# Patient Record
Sex: MALE | Race: BLACK OR AFRICAN AMERICAN | NOT HISPANIC OR LATINO | Employment: STUDENT | URBAN - METROPOLITAN AREA
[De-identification: names, ages, dates, MRNs, and addresses within clinical notes are randomized per-mention and may not be internally consistent; named-entity substitution may affect disease eponyms.]

---

## 2017-01-03 PROBLEM — D58.2 HEMOGLOBIN C TRAIT: Status: ACTIVE | Noted: 2017-01-03

## 2018-01-30 ENCOUNTER — LAB VISIT (OUTPATIENT)
Dept: LAB | Facility: HOSPITAL | Age: 23
End: 2018-01-30
Attending: INTERNAL MEDICINE
Payer: COMMERCIAL

## 2018-01-30 ENCOUNTER — OFFICE VISIT (OUTPATIENT)
Dept: INTERNAL MEDICINE | Facility: CLINIC | Age: 23
End: 2018-01-30
Payer: COMMERCIAL

## 2018-01-30 VITALS
BODY MASS INDEX: 21.55 KG/M2 | HEIGHT: 69 IN | HEART RATE: 64 BPM | DIASTOLIC BLOOD PRESSURE: 76 MMHG | SYSTOLIC BLOOD PRESSURE: 120 MMHG | RESPIRATION RATE: 18 BRPM | WEIGHT: 145.5 LBS | TEMPERATURE: 98 F

## 2018-01-30 DIAGNOSIS — R53.83 FATIGUE, UNSPECIFIED TYPE: ICD-10-CM

## 2018-01-30 DIAGNOSIS — M54.50 CHRONIC MIDLINE LOW BACK PAIN WITHOUT SCIATICA: Primary | ICD-10-CM

## 2018-01-30 DIAGNOSIS — M25.512 CHRONIC LEFT SHOULDER PAIN: ICD-10-CM

## 2018-01-30 DIAGNOSIS — G89.29 CHRONIC LEFT SHOULDER PAIN: ICD-10-CM

## 2018-01-30 DIAGNOSIS — G89.29 CHRONIC MIDLINE LOW BACK PAIN WITHOUT SCIATICA: Primary | ICD-10-CM

## 2018-01-30 DIAGNOSIS — R21 RASH AND NONSPECIFIC SKIN ERUPTION: ICD-10-CM

## 2018-01-30 LAB
25(OH)D3+25(OH)D2 SERPL-MCNC: 7 NG/ML
BASOPHILS # BLD AUTO: 0.02 K/UL
BASOPHILS NFR BLD: 0.4 %
DIFFERENTIAL METHOD: ABNORMAL
EOSINOPHIL # BLD AUTO: 0.2 K/UL
EOSINOPHIL NFR BLD: 4 %
ERYTHROCYTE [DISTWIDTH] IN BLOOD BY AUTOMATED COUNT: 12.4 %
HCT VFR BLD AUTO: 39.1 %
HGB BLD-MCNC: 13.2 G/DL
IMM GRANULOCYTES # BLD AUTO: 0.01 K/UL
IMM GRANULOCYTES NFR BLD AUTO: 0.2 %
LYMPHOCYTES # BLD AUTO: 1.7 K/UL
LYMPHOCYTES NFR BLD: 36.6 %
MCH RBC QN AUTO: 28.6 PG
MCHC RBC AUTO-ENTMCNC: 33.8 G/DL
MCV RBC AUTO: 85 FL
MONOCYTES # BLD AUTO: 0.5 K/UL
MONOCYTES NFR BLD: 9.7 %
NEUTROPHILS # BLD AUTO: 2.3 K/UL
NEUTROPHILS NFR BLD: 49.1 %
NRBC BLD-RTO: 0 /100 WBC
PLATELET # BLD AUTO: 194 K/UL
PMV BLD AUTO: 14.2 FL
RBC # BLD AUTO: 4.61 M/UL
TSH SERPL DL<=0.005 MIU/L-ACNC: 0.49 UIU/ML
WBC # BLD AUTO: 4.76 K/UL

## 2018-01-30 PROCEDURE — 99214 OFFICE O/P EST MOD 30 MIN: CPT | Mod: S$PBB,,, | Performed by: INTERNAL MEDICINE

## 2018-01-30 PROCEDURE — 99999 PR PBB SHADOW E&M-EST. PATIENT-LVL IV: CPT | Mod: PBBFAC,,, | Performed by: INTERNAL MEDICINE

## 2018-01-30 PROCEDURE — 3008F BODY MASS INDEX DOCD: CPT | Mod: ,,, | Performed by: INTERNAL MEDICINE

## 2018-01-30 PROCEDURE — 84443 ASSAY THYROID STIM HORMONE: CPT

## 2018-01-30 PROCEDURE — 85025 COMPLETE CBC W/AUTO DIFF WBC: CPT

## 2018-01-30 PROCEDURE — 36415 COLL VENOUS BLD VENIPUNCTURE: CPT | Mod: PO

## 2018-01-30 PROCEDURE — 82306 VITAMIN D 25 HYDROXY: CPT

## 2018-01-30 NOTE — PATIENT INSTRUCTIONS
Cortizone cream to back, avoid irritants    Try Therma Care heat paches on low back    Try salon pas with lidocaine

## 2018-01-30 NOTE — PROGRESS NOTES
Subjective:       Patient ID: Maria Ines Lea is a 22 y.o. male who presents for Back Pain; Shoulder Pain; and Fatigue      Back Pain   This is a recurrent problem. The current episode started more than 1 year ago. The problem occurs intermittently. The problem has been waxing and waning since onset. The pain is present in the lumbar spine. The quality of the pain is described as aching. The pain does not radiate. The pain is at a severity of 6/10. The pain is moderate. The pain is worse during the day. The symptoms are aggravated by bending, position, standing and stress (notices pain when he is active at work with bending and lifting). Stiffness is present in the morning. Associated symptoms include leg pain. Pertinent negatives include no abdominal pain, bladder incontinence, bowel incontinence, chest pain, dysuria, fever, headaches, numbness, paresis, paresthesias, pelvic pain, perianal numbness, tingling, weakness or weight loss. He has tried NSAIDs, brace/corset and heat for the symptoms.   Shoulder Pain    The pain is present in the left shoulder. This is a chronic problem. The current episode started more than 1 month ago. The problem occurs intermittently. The problem has been waxing and waning. The quality of the pain is described as aching. The pain is moderate. Associated symptoms include stiffness. Pertinent negatives include no fever, headaches, joint locking, limited range of motion, numbness or tingling. The symptoms are aggravated by activity. He has tried nothing for the symptoms. Family history does not include arthritis. There is no history of Injuries to Extremity.   Fatigue   This is a new problem. The current episode started more than 1 month ago. The problem occurs constantly. The problem has been unchanged (patient works 12 hours almost every night and goes to school). Associated symptoms include fatigue and a rash (on upper neck, denies itching). Pertinent negatives include no abdominal  pain, arthralgias, chest pain, chills, congestion, coughing, fever, headaches, myalgias, nausea, numbness, vertigo, vomiting or weakness. Nothing aggravates the symptoms. He has tried nothing for the symptoms.   Rash   This is a new problem. The current episode started in the past 7 days. The problem is unchanged. The affected locations include the neck and back. He was exposed to a new detergent/soap (used new soap and cologne in last few weeks). Associated symptoms include fatigue. Pertinent negatives include no congestion, cough, diarrhea, fever, rhinorrhea, shortness of breath or vomiting. Past treatments include nothing. His past medical history is significant for asthma. There is no history of allergies.        Answers for HPI/ROS submitted by the patient on 1/30/2018   Back pain  Chronicity: recurrent  Onset: more than 1 year ago  Frequency: intermittently  Progression since onset: waxing and waning  Pain location: lumbar spine  Pain quality: aching  Radiates to: does not radiate  Pain - numeric: 6/10  Pain is: worse during the day  Aggravated by: bending, position, standing, stress  Stiffness is present: in the morning  abdominal pain: No  bladder incontinence: No  bowel incontinence: No  chest pain: No  dysuria: No  fever: No  headaches: No  leg pain: Yes  numbness: No  paresis: No  paresthesias: No  pelvic pain: No  perianal numbness: No  tingling: No  weakness: No  weight loss: No  genital pain: No  hematuria: No  Pain severity: moderate  Treatments tried: NSAIDs, brace/corset, heat      Review of Systems   Constitutional: Positive for fatigue. Negative for chills, fever and weight loss.   HENT: Negative for congestion, rhinorrhea, sinus pressure and tinnitus (reports episode of ringing in ear after heard fireworks very close to him, ringing later resolved and has not returned ).    Eyes: Negative for visual disturbance.   Respiratory: Negative for cough and shortness of breath.    Cardiovascular:  Negative for chest pain, palpitations and leg swelling.   Gastrointestinal: Negative for abdominal pain, bowel incontinence, constipation, diarrhea, nausea and vomiting.   Genitourinary: Negative for bladder incontinence, dysuria, hematuria and pelvic pain.   Musculoskeletal: Positive for back pain and stiffness. Negative for arthralgias and myalgias.   Skin: Positive for rash (on upper neck, denies itching).   Neurological: Negative for dizziness, vertigo, tingling, weakness, numbness, headaches and paresthesias.   Hematological: Negative for adenopathy. Does not bruise/bleed easily.       Answers for HPI/ROS submitted by the patient on 1/30/2018   Back pain  genital pain: No      Objective:      Physical Exam   Constitutional: He is oriented to person, place, and time. Vital signs are normal. He appears well-developed and well-nourished. No distress.   HENT:   Head: Normocephalic and atraumatic.   Right Ear: Hearing, tympanic membrane, external ear and ear canal normal.   Left Ear: Hearing, tympanic membrane, external ear and ear canal normal.   Nose: Nose normal.   Mouth/Throat: Uvula is midline.   Eyes: Lids are normal. No scleral icterus.   Neck: Full passive range of motion without pain.   Cardiovascular: Normal rate, regular rhythm, normal heart sounds, intact distal pulses and normal pulses.    No murmur heard.  Pulmonary/Chest: Effort normal and breath sounds normal. He has no wheezes.   Abdominal: Soft. Bowel sounds are normal. He exhibits no distension. There is no tenderness.   Musculoskeletal: Normal range of motion. He exhibits no edema.        Left shoulder: He exhibits normal range of motion, no bony tenderness and no pain.        Cervical back: He exhibits no bony tenderness, no pain and no spasm.        Thoracic back: He exhibits no bony tenderness, no pain and no spasm.        Lumbar back: He exhibits no bony tenderness, no pain and no spasm.   Negative straight leg raise   Neurological: He is  alert and oriented to person, place, and time. He has normal reflexes. He displays normal reflexes. Coordination and gait normal.   Skin: Skin is warm, dry and intact. Rash noted. Rash is maculopapular (posterior neck, no erythema, no scaling).   Psychiatric: He has a normal mood and affect.   Vitals reviewed.      Assessment:       1. Chronic midline low back pain without sciatica    2. Chronic left shoulder pain    3. Fatigue, unspecified type    4. Rash and nonspecific skin eruption        Plan:       1. Chronic midline low back pain without sciatica  - patient is hesitant to begin medication since he cannot swallow pills well  - declines PT or healthy back program due to time limitations  - may use topical analgesics or heat patches when back pain is severe    2. Chronic left shoulder pain  - trial of topical analgesics or may use liquid ibuprofen or tylenol as needed for pains    3. Fatigue, unspecified type  - CBC auto differential; Future  - Vitamin D; Future  - TSH; Future    4. Rash and nonspecific skin eruption  - stop using new bath products and cologne for 1-2 weeks  - use OTC hydrocortisone cream on area twice daily  - determine if other products may have caused rash, call if worsens    RTC in 4 months for annual exam    Mirtha Arguelles MD

## 2018-01-31 PROBLEM — E55.9 VITAMIN D DEFICIENCY: Status: ACTIVE | Noted: 2018-01-31

## 2018-01-31 RX ORDER — ERGOCALCIFEROL 1.25 MG/1
50000 CAPSULE ORAL
Qty: 4 CAPSULE | Refills: 2
Start: 2018-01-31 | End: 2018-01-31 | Stop reason: ALTCHOICE

## 2018-03-19 ENCOUNTER — OFFICE VISIT (OUTPATIENT)
Dept: URGENT CARE | Facility: CLINIC | Age: 23
End: 2018-03-19
Payer: COMMERCIAL

## 2018-03-19 VITALS
HEART RATE: 71 BPM | WEIGHT: 145 LBS | RESPIRATION RATE: 18 BRPM | BODY MASS INDEX: 21.48 KG/M2 | DIASTOLIC BLOOD PRESSURE: 80 MMHG | OXYGEN SATURATION: 97 % | SYSTOLIC BLOOD PRESSURE: 116 MMHG | TEMPERATURE: 98 F | HEIGHT: 69 IN

## 2018-03-19 DIAGNOSIS — R68.89 FLU-LIKE SYMPTOMS: ICD-10-CM

## 2018-03-19 DIAGNOSIS — J30.9 ACUTE ALLERGIC RHINITIS, UNSPECIFIED SEASONALITY, UNSPECIFIED TRIGGER: Primary | ICD-10-CM

## 2018-03-19 DIAGNOSIS — J02.9 SORE THROAT: ICD-10-CM

## 2018-03-19 DIAGNOSIS — Z87.09 HISTORY OF ASTHMA: ICD-10-CM

## 2018-03-19 LAB
CTP QC/QA: YES
CTP QC/QA: YES
FLUAV AG NPH QL: NEGATIVE
FLUBV AG NPH QL: NEGATIVE
S PYO RRNA THROAT QL PROBE: NEGATIVE

## 2018-03-19 PROCEDURE — 87880 STREP A ASSAY W/OPTIC: CPT | Mod: QW,S$GLB,, | Performed by: NURSE PRACTITIONER

## 2018-03-19 PROCEDURE — 87804 INFLUENZA ASSAY W/OPTIC: CPT | Mod: QW,S$GLB,, | Performed by: NURSE PRACTITIONER

## 2018-03-19 PROCEDURE — 99214 OFFICE O/P EST MOD 30 MIN: CPT | Mod: S$GLB,,, | Performed by: NURSE PRACTITIONER

## 2018-03-19 RX ORDER — MONTELUKAST SODIUM 10 MG/1
10 TABLET ORAL DAILY
Qty: 30 TABLET | Refills: 0 | Status: SHIPPED | OUTPATIENT
Start: 2018-03-19 | End: 2018-04-16 | Stop reason: SDUPTHER

## 2018-03-19 RX ORDER — FLUTICASONE PROPIONATE 50 MCG
1 SPRAY, SUSPENSION (ML) NASAL 2 TIMES DAILY
Qty: 1 BOTTLE | Refills: 3 | Status: SHIPPED | OUTPATIENT
Start: 2018-03-19

## 2018-03-19 NOTE — PROGRESS NOTES
"Subjective:       Patient ID: Maria Ines Lea is a 22 y.o. male.    Vitals:  height is 5' 9" (1.753 m) and weight is 65.8 kg (145 lb). His oral temperature is 98 °F (36.7 °C). His blood pressure is 116/80 and his pulse is 71. His respiration is 18 and oxygen saturation is 97%.     Chief Complaint: Sore Throat    1-2 days of itchy watery eyes, scratchy throat, malaise and sinus congestion with headache.  No treatments tried. No fever, N/V/D or sick contacts. Endorses history of allergy.      Sore Throat    This is a new problem. The current episode started today. The problem has been gradually worsening. Neither side of throat is experiencing more pain than the other. There has been no fever. The pain is at a severity of 6/10. The pain is moderate. Associated symptoms include congestion, headaches and trouble swallowing. Pertinent negatives include no abdominal pain, coughing, ear pain, hoarse voice or shortness of breath. He has tried nothing for the symptoms.     Review of Systems   Constitution: Negative for chills, fever and malaise/fatigue.   HENT: Positive for congestion, sore throat and trouble swallowing. Negative for ear pain and hoarse voice.    Eyes: Positive for discharge. Negative for redness.   Cardiovascular: Negative for chest pain, dyspnea on exertion and leg swelling.   Respiratory: Negative for cough, shortness of breath, sputum production and wheezing.    Musculoskeletal: Negative for myalgias.   Gastrointestinal: Negative for abdominal pain and nausea.   Neurological: Positive for headaches.       Objective:      Physical Exam   Constitutional: He is oriented to person, place, and time. He appears well-developed and well-nourished. He is cooperative.  Non-toxic appearance. He does not appear ill. No distress.   HENT:   Head: Normocephalic and atraumatic.   Right Ear: Hearing, tympanic membrane, external ear and ear canal normal.   Left Ear: Hearing, tympanic membrane, external ear and ear " canal normal.   Nose: Rhinorrhea present. No mucosal edema or nasal deformity. No epistaxis. Right sinus exhibits no maxillary sinus tenderness and no frontal sinus tenderness. Left sinus exhibits no maxillary sinus tenderness and no frontal sinus tenderness.   Mouth/Throat: Uvula is midline and mucous membranes are normal. No trismus in the jaw. Normal dentition. No uvula swelling. Posterior oropharyngeal erythema present.   Eyes: Conjunctivae and lids are normal. Right eye exhibits exudate. Left eye exhibits exudate. No scleral icterus.   Watery exudate   Neck: Trachea normal, full passive range of motion without pain and phonation normal. Neck supple.   Cardiovascular: Normal rate, regular rhythm, normal heart sounds, intact distal pulses and normal pulses.    Pulmonary/Chest: Effort normal and breath sounds normal. No respiratory distress. He has no wheezes. He has no rhonchi. He has no rales.   Abdominal: Soft. Normal appearance and bowel sounds are normal. He exhibits no distension. There is no tenderness.   Musculoskeletal: Normal range of motion. He exhibits no edema or deformity.   Neurological: He is alert and oriented to person, place, and time. He exhibits normal muscle tone. Coordination normal.   Skin: Skin is warm, dry and intact. He is not diaphoretic. No pallor.   Psychiatric: He has a normal mood and affect. His speech is normal and behavior is normal. Judgment and thought content normal. Cognition and memory are normal.   Nursing note and vitals reviewed.      POCT Influenza A/B   Order: [id number]   Status:  Final result   Visible to patient:  No (Not Released)   Next appt:  None   Dx:  Flu-like symptoms    Ref Range & Units 11:03   Rapid Influenza A Ag Negative Negative    Rapid Influenza B Ag Negative Negative     Acceptable  Yes    Resulting Agency  [facility]           POCT rapid strep A   Order: [id number]   Status:  Final result   Visible to patient:  No (Not Released)   Next appt:   None   Dx:  Sore throat   Newer results are available. Click to view them now.    Ref Range & Units 10:59   Rapid Strep A Screen Negative Negative     Acceptable  Yes    Resulting Agency  [facility]               Assessment:       1. Acute allergic rhinitis, unspecified seasonality, unspecified trigger    2. Sore throat    3. Flu-like symptoms    4. History of asthma        Plan:         Acute allergic rhinitis, unspecified seasonality, unspecified trigger  -     montelukast (SINGULAIR) 10 mg tablet; Take 1 tablet (10 mg total) by mouth once daily.  Dispense: 30 tablet; Refill: 0  -     fluticasone (FLONASE) 50 mcg/actuation nasal spray; 1 spray (50 mcg total) by Each Nare route 2 (two) times daily.  Dispense: 1 Bottle; Refill: 3    Sore throat  -     POCT rapid strep A    Flu-like symptoms  -     POCT Influenza A/B    History of asthma  -     montelukast (SINGULAIR) 10 mg tablet; Take 1 tablet (10 mg total) by mouth once daily.  Dispense: 30 tablet; Refill: 0      Patient Instructions     Please drink plenty of fluids.  Please get plenty of rest.  Please return here or go to the Emergency Department for any concerns or worsening of condition.  You can take over the counter Allegra or Claritin or Zyrtec as directed during the daytime hours as these generally do not cause drowsiness.  Please follow up with your primary care doctor or specialist as needed.    If you  smoke, please stop smoking.    Allergic Rhinitis   Allergic rhinitis is an allergic reaction that affects the nose, and often the eyes. Its often known as nasal allergies. Nasal allergies are often due to things in the environment that are breathed in. Depending what you are sensitive to, nasal allergies may occur only during certain seasons. Or they may occur year round. Common indoor allergens include house dust mites, mold, cockroaches, and pet dander. Outdoor allergens include pollen from trees, grasses, and weeds.   Symptoms include a  drippy, stuffy, and itchy nose. They also include sneezing and red and itchy eyes. You may feel tired more often. Severe allergies may also affect your breathing and trigger a condition called asthma.   Tests can be done to see what allergens are affecting you. You may be referred to an allergy specialist for testing and further evaluation.  Home care  Your healthcare provider may prescribe medicines to help relieve allergy symptoms. These may include oral medicines, nasal sprays, or eye drops.  Ask your provider for advice on how to avoid substances that you are allergic to. Below are a few tips for each type of allergen.  Pet dander:  · Do not have pets with fur and feathers.  · If you can't avoid having a pet, keep it out of your bedroom and off upholstered furniture.  Pollen:  · When pollen counts are high, keep windows of your car and home closed. If possible, use an air conditioner instead.  · Wear a filter mask when mowing or doing yard work.  House dust mites:  · Wash bedding every week in warm water and detergent and dry on a hot setting.  · Cover the mattress, box spring, and pillows with allergy covers.   · If possible, sleep in a room with no carpet, curtains, or upholstered furniture.  Cockroaches:  · Store food in sealed containers.  · Remove garbage from the home promptly.  · Fix water leaks  Mold:  · Keep humidity low by using a dehumidifier or air conditioner. Keep the dehumidifier and air conditioner clean and free of mold.  · Clean moldy areas with bleach and water.  In general:  · Vacuum once or twice a week. If possible, use a vacuum with a high-efficiency particulate air (HEPA) filter.  · Do not smoke. Avoid cigarette smoke. Cigarette smoke is an irritant that can make symptoms worse.  Follow-up care  Follow up as advised by the healthcare provider or our staff. If you were referred to an allergy specialist, make this appointment promptly.  When to seek medical advice  Call your healthcare  provider right away if the following occur:  · Coughing or wheezing  · Fever greater than 100.4°F (38°C)  · Hives (raised red bumps)  · Continuing symptoms, new symptoms, or worsening symptoms  Call 911 right away if you have:  · Trouble breathing  · Severe swelling of the face or severe itching of the eyes or mouth  Date Last Reviewed: 3/1/2017  © 5351-6196 [employer]. [address] [place] [address], [place] [address]. All rights reserved. This information is not intended as a substitute for professional medical care. Always follow your healthcare professional's instructions.

## 2018-03-19 NOTE — PATIENT INSTRUCTIONS
Please drink plenty of fluids.  Please get plenty of rest.  Please return here or go to the Emergency Department for any concerns or worsening of condition.  You can take over the counter Allegra or Claritin or Zyrtec as directed during the daytime hours as these generally do not cause drowsiness.  Please follow up with your primary care doctor or specialist as needed.    If you  smoke, please stop smoking.    Allergic Rhinitis   Allergic rhinitis is an allergic reaction that affects the nose, and often the eyes. Its often known as nasal allergies. Nasal allergies are often due to things in the environment that are breathed in. Depending what you are sensitive to, nasal allergies may occur only during certain seasons. Or they may occur year round. Common indoor allergens include house dust mites, mold, cockroaches, and pet dander. Outdoor allergens include pollen from trees, grasses, and weeds.   Symptoms include a drippy, stuffy, and itchy nose. They also include sneezing and red and itchy eyes. You may feel tired more often. Severe allergies may also affect your breathing and trigger a condition called asthma.   Tests can be done to see what allergens are affecting you. You may be referred to an allergy specialist for testing and further evaluation.  Home care  Your healthcare provider may prescribe medicines to help relieve allergy symptoms. These may include oral medicines, nasal sprays, or eye drops.  Ask your provider for advice on how to avoid substances that you are allergic to. Below are a few tips for each type of allergen.  Pet dander:  · Do not have pets with fur and feathers.  · If you can't avoid having a pet, keep it out of your bedroom and off upholstered furniture.  Pollen:  · When pollen counts are high, keep windows of your car and home closed. If possible, use an air conditioner instead.  · Wear a filter mask when mowing or doing yard work.  House dust mites:  · Wash bedding every week in warm  water and detergent and dry on a hot setting.  · Cover the mattress, box spring, and pillows with allergy covers.   · If possible, sleep in a room with no carpet, curtains, or upholstered furniture.  Cockroaches:  · Store food in sealed containers.  · Remove garbage from the home promptly.  · Fix water leaks  Mold:  · Keep humidity low by using a dehumidifier or air conditioner. Keep the dehumidifier and air conditioner clean and free of mold.  · Clean moldy areas with bleach and water.  In general:  · Vacuum once or twice a week. If possible, use a vacuum with a high-efficiency particulate air (HEPA) filter.  · Do not smoke. Avoid cigarette smoke. Cigarette smoke is an irritant that can make symptoms worse.  Follow-up care  Follow up as advised by the healthcare provider or our staff. If you were referred to an allergy specialist, make this appointment promptly.  When to seek medical advice  Call your healthcare provider right away if the following occur:  · Coughing or wheezing  · Fever greater than 100.4°F (38°C)  · Hives (raised red bumps)  · Continuing symptoms, new symptoms, or worsening symptoms  Call 911 right away if you have:  · Trouble breathing  · Severe swelling of the face or severe itching of the eyes or mouth  Date Last Reviewed: 3/1/2017  © 8442-9075 [employer]. [address] [place] [address], [place] [address]. All rights reserved. This information is not intended as a substitute for professional medical care. Always follow your healthcare professional's instructions.

## 2018-04-16 DIAGNOSIS — Z87.09 HISTORY OF ASTHMA: ICD-10-CM

## 2018-04-16 DIAGNOSIS — J30.9 ACUTE ALLERGIC RHINITIS, UNSPECIFIED SEASONALITY, UNSPECIFIED TRIGGER: ICD-10-CM

## 2018-04-16 RX ORDER — MONTELUKAST SODIUM 10 MG/1
10 TABLET ORAL DAILY
Qty: 30 TABLET | Refills: 0 | Status: SHIPPED | OUTPATIENT
Start: 2018-04-16 | End: 2019-02-18

## 2018-08-27 ENCOUNTER — LAB VISIT (OUTPATIENT)
Dept: LAB | Facility: HOSPITAL | Age: 23
End: 2018-08-27
Attending: INTERNAL MEDICINE
Payer: COMMERCIAL

## 2018-08-27 ENCOUNTER — OFFICE VISIT (OUTPATIENT)
Dept: INTERNAL MEDICINE | Facility: CLINIC | Age: 23
End: 2018-08-27
Payer: COMMERCIAL

## 2018-08-27 VITALS
WEIGHT: 159.19 LBS | HEIGHT: 66 IN | RESPIRATION RATE: 16 BRPM | SYSTOLIC BLOOD PRESSURE: 125 MMHG | BODY MASS INDEX: 25.58 KG/M2 | DIASTOLIC BLOOD PRESSURE: 78 MMHG | TEMPERATURE: 99 F | HEART RATE: 83 BPM

## 2018-08-27 DIAGNOSIS — E55.9 VITAMIN D DEFICIENCY: ICD-10-CM

## 2018-08-27 DIAGNOSIS — Z20.2 POSSIBLE EXPOSURE TO STD: ICD-10-CM

## 2018-08-27 DIAGNOSIS — R21 RASH AND NONSPECIFIC SKIN ERUPTION: Primary | ICD-10-CM

## 2018-08-27 PROBLEM — Z87.2 HISTORY OF ECZEMA: Status: ACTIVE | Noted: 2018-08-27

## 2018-08-27 LAB — 25(OH)D3+25(OH)D2 SERPL-MCNC: 21 NG/ML

## 2018-08-27 PROCEDURE — 36415 COLL VENOUS BLD VENIPUNCTURE: CPT | Mod: PO

## 2018-08-27 PROCEDURE — 99213 OFFICE O/P EST LOW 20 MIN: CPT | Mod: S$GLB,,, | Performed by: INTERNAL MEDICINE

## 2018-08-27 PROCEDURE — 82306 VITAMIN D 25 HYDROXY: CPT

## 2018-08-27 PROCEDURE — 80074 ACUTE HEPATITIS PANEL: CPT

## 2018-08-27 PROCEDURE — 86696 HERPES SIMPLEX TYPE 2 TEST: CPT

## 2018-08-27 PROCEDURE — 3008F BODY MASS INDEX DOCD: CPT | Mod: CPTII,S$GLB,, | Performed by: INTERNAL MEDICINE

## 2018-08-27 PROCEDURE — 86592 SYPHILIS TEST NON-TREP QUAL: CPT

## 2018-08-27 PROCEDURE — 86703 HIV-1/HIV-2 1 RESULT ANTBDY: CPT

## 2018-08-27 PROCEDURE — 99999 PR PBB SHADOW E&M-EST. PATIENT-LVL III: CPT | Mod: PBBFAC,,, | Performed by: INTERNAL MEDICINE

## 2018-08-27 RX ORDER — DESONIDE 0.5 MG/ML
LOTION TOPICAL 2 TIMES DAILY
Qty: 59 ML | Refills: 0 | Status: SHIPPED | OUTPATIENT
Start: 2018-08-27 | End: 2019-04-01

## 2018-08-27 NOTE — PROGRESS NOTES
Subjective:       Patient ID: Maria Ines Lea is a 23 y.o. male who presents for Rash and Back Pain      Rash   This is a recurrent problem. The current episode started 1 to 4 weeks ago. The problem has been waxing and waning since onset. The affected locations include the right arm and left arm. The rash is characterized by scaling (raised lesion on upper arms). He was exposed to nothing. Pertinent negatives include no congestion, cough, diarrhea, fever, rhinorrhea, shortness of breath or vomiting. Past treatments include moisturizer (uses neosporin eczema). The treatment provided no relief. His past medical history is significant for eczema.   Low-back Pain   This is a recurrent problem. The current episode started 1 to 4 weeks ago. The problem occurs intermittently. The problem has been waxing and waning. Associated symptoms include a rash. Pertinent negatives include no abdominal pain, arthralgias, chest pain, chills, congestion, coughing, fever, headaches, myalgias, nausea, numbness or vomiting. Nothing aggravates the symptoms. Treatments tried: tried Icy Hot. The treatment provided mild relief.      Scaly lesions on feet intermittently, + itch.      Review of Systems   Constitutional: Negative for chills and fever.   HENT: Negative for congestion, rhinorrhea and sinus pressure.    Eyes: Negative for redness and itching.   Respiratory: Negative for cough and shortness of breath.    Cardiovascular: Negative for chest pain and palpitations.   Gastrointestinal: Negative for abdominal pain, diarrhea, nausea and vomiting.   Musculoskeletal: Positive for back pain. Negative for arthralgias and myalgias.   Skin: Positive for rash.   Allergic/Immunologic: Negative for environmental allergies.   Neurological: Negative for dizziness, numbness and headaches.       Objective:      Physical Exam   Constitutional: He is oriented to person, place, and time. Vital signs are normal. He appears well-developed and  well-nourished. No distress.   HENT:   Head: Normocephalic and atraumatic.   Right Ear: Hearing and external ear normal.   Left Ear: Hearing and external ear normal.   Nose: Nose normal.   Eyes: Lids are normal. No scleral icterus.   Neck: Full passive range of motion without pain.   Cardiovascular: Normal rate, regular rhythm, normal heart sounds, intact distal pulses and normal pulses.   No murmur heard.  Pulmonary/Chest: Effort normal and breath sounds normal. He has no wheezes.   Abdominal: Soft. Bowel sounds are normal. He exhibits no distension. There is no tenderness.   Musculoskeletal: Normal range of motion. He exhibits no edema.        Cervical back: He exhibits no bony tenderness and no pain.        Thoracic back: He exhibits no bony tenderness and no pain.        Lumbar back: He exhibits no bony tenderness and no pain.   Neurological: He is alert and oriented to person, place, and time.   Skin: Skin is warm, dry and intact. Rash noted. Rash is macular.   Psychiatric: He has a normal mood and affect.   Vitals reviewed.      Assessment/Plan:       1. Rash and nonspecific skin eruption  - desonide (DESOWEN) 0.05 % lotion; Apply topically 2 (two) times daily. for 10 days  Dispense: 59 mL; Refill: 0    2. Vitamin D deficiency  - Vitamin D; Future    3. Possible exposure to STD  - C. trachomatis/N. gonorrhoeae by AMP DNA; Future  - Hepatitis panel, acute; Future  - Herpes simplex type 1&2 IgG,Herpes titer; Future  - HIV-1 and HIV-2 antibodies; Future  - RPR; Future    Mirtha Arguelles MD

## 2018-08-28 LAB
HAV IGM SERPL QL IA: NEGATIVE
HBV CORE IGM SERPL QL IA: NEGATIVE
HBV SURFACE AG SERPL QL IA: NEGATIVE
HCV AB SERPL QL IA: NEGATIVE
HIV 1+2 AB+HIV1 P24 AG SERPL QL IA: NEGATIVE
HSV1 IGG SERPL QL IA: NEGATIVE
HSV2 IGG SERPL QL IA: NEGATIVE
RPR SER QL: NORMAL

## 2019-02-18 ENCOUNTER — OFFICE VISIT (OUTPATIENT)
Dept: INTERNAL MEDICINE | Facility: CLINIC | Age: 24
End: 2019-02-18
Payer: COMMERCIAL

## 2019-02-18 ENCOUNTER — TELEPHONE (OUTPATIENT)
Dept: INTERNAL MEDICINE | Facility: CLINIC | Age: 24
End: 2019-02-18

## 2019-02-18 VITALS
TEMPERATURE: 98 F | BODY MASS INDEX: 26.53 KG/M2 | HEART RATE: 74 BPM | HEIGHT: 67 IN | WEIGHT: 169.06 LBS | RESPIRATION RATE: 16 BRPM | DIASTOLIC BLOOD PRESSURE: 87 MMHG | SYSTOLIC BLOOD PRESSURE: 120 MMHG

## 2019-02-18 DIAGNOSIS — M25.562 ACUTE PAIN OF LEFT KNEE: Primary | ICD-10-CM

## 2019-02-18 DIAGNOSIS — Z00.00 ANNUAL PHYSICAL EXAM: Primary | ICD-10-CM

## 2019-02-18 PROCEDURE — 99999 PR PBB SHADOW E&M-EST. PATIENT-LVL III: ICD-10-PCS | Mod: PBBFAC,,, | Performed by: HOSPITALIST

## 2019-02-18 PROCEDURE — 99213 OFFICE O/P EST LOW 20 MIN: CPT | Mod: S$GLB,,, | Performed by: HOSPITALIST

## 2019-02-18 PROCEDURE — 3008F PR BODY MASS INDEX (BMI) DOCUMENTED: ICD-10-PCS | Mod: CPTII,S$GLB,, | Performed by: HOSPITALIST

## 2019-02-18 PROCEDURE — 99999 PR PBB SHADOW E&M-EST. PATIENT-LVL III: CPT | Mod: PBBFAC,,, | Performed by: HOSPITALIST

## 2019-02-18 PROCEDURE — 99213 PR OFFICE/OUTPT VISIT, EST, LEVL III, 20-29 MIN: ICD-10-PCS | Mod: S$GLB,,, | Performed by: HOSPITALIST

## 2019-02-18 PROCEDURE — 3008F BODY MASS INDEX DOCD: CPT | Mod: CPTII,S$GLB,, | Performed by: HOSPITALIST

## 2019-02-18 NOTE — TELEPHONE ENCOUNTER
Please enter orders for routine labs to be drawn and alert staff so they can be linked to appt. Thank you

## 2019-02-18 NOTE — TELEPHONE ENCOUNTER
----- Message from Mayela Chairez sent at 2/18/2019  1:57 PM CST -----  Contact:  [phone]  Doctor appointment and lab have been scheduled.  Please link lab orders to the lab appointment.  Date of doctor appointment:  04-  Physical or EP:  PHYSICAL  Date of lab appointment:  03-  Comments:     REMINDER to appt coordinator: ## delete this from the message after reading! ##     1) The lab appointment must be at least 3 days from now to allow time for the order to be entered and linked to the appointment.   2) Lab appointment should be scheduled at least 3 days before the doctor appointment.

## 2019-02-18 NOTE — PROGRESS NOTES
"Subjective:     @Patient ID: Maria Ines Lea is a 23 y.o. male.    Chief Complaint: Leg Pain    HPI   22 yo male presents for urgent visit:   Pt reports 2 days of left knee pain. Located inside of left knee. Reports worse with walking. Started using knee brace and it has improved.  Reports hx of being a runner.   Reports he is on his feet a lot. Works in a restaurant. Has not fallen or injured the leg.   Reports worse with movement   In the past as a teen has hx of knee pain. Reports he was dx with osgood schlatter syndrome.  When sx started pain was 10/10. Now pt reports today it is much improved.   Reports sometimes having clicking sensation of knee when standing up. Reports he has gained weight as not working out much      Review of Systems   Constitutional: Negative for chills and fever.   HENT: Negative for congestion and sore throat.    Eyes: Negative for pain and visual disturbance.   Respiratory: Negative for cough and shortness of breath.    Cardiovascular: Negative for chest pain and leg swelling.   Gastrointestinal: Negative for abdominal pain, nausea and vomiting.   Endocrine: Negative for polydipsia and polyuria.   Genitourinary: Negative for difficulty urinating and dysuria.   Musculoskeletal: Positive for arthralgias. Negative for back pain.   Skin: Negative for rash and wound.   Neurological: Negative for weakness and headaches.   Psychiatric/Behavioral: Negative for agitation and confusion.     Past medical history, surgical history, and family medical history reviewed and updated as appropriate.    Medications and allergies reviewed.     Objective:     Vitals:    02/18/19 1336   BP: 120/87   Pulse: 74   Resp: 16   Temp: 98 °F (36.7 °C)   TempSrc: Oral   Weight: 76.7 kg (169 lb 1.5 oz)   Height: 5' 7" (1.702 m)     Body mass index is 26.48 kg/m².  Physical Exam   Constitutional: He is oriented to person, place, and time. He appears well-developed and well-nourished. No distress.   HENT:   Head: " Normocephalic and atraumatic.   Mouth/Throat: Oropharynx is clear and moist. No oropharyngeal exudate.   Eyes: Conjunctivae are normal. Pupils are equal, round, and reactive to light. Right eye exhibits no discharge. Left eye exhibits no discharge.   Neck: Normal range of motion. Neck supple.   Cardiovascular: Normal rate, regular rhythm and normal heart sounds. Exam reveals no friction rub.   No murmur heard.  Pulmonary/Chest: Effort normal and breath sounds normal.   Abdominal: Soft. Bowel sounds are normal. He exhibits no distension. There is no tenderness.   Musculoskeletal: Normal range of motion. He exhibits no edema.   No swelling of knees   No tenderness on palpation of knee  Normal ROM of BLE   Neurological: He is alert and oriented to person, place, and time.   Skin: Skin is warm and dry.   Psychiatric: He has a normal mood and affect. His behavior is normal.   Vitals reviewed.      Lab Results   Component Value Date    WBC 4.76 01/30/2018    HGB 13.2 (L) 01/30/2018    HCT 39.1 (L) 01/30/2018     01/30/2018    CHOL 127 12/29/2016    TRIG 51 12/29/2016    HDL 44 12/29/2016    ALT 13 12/29/2016    AST 18 12/29/2016     12/29/2016    K 3.8 12/29/2016     12/29/2016    CREATININE 0.9 12/29/2016    BUN 14 12/29/2016    CO2 28 12/29/2016    TSH 0.486 01/30/2018       Assessment:     1. Acute pain of left knee      Plan:   Maria Ines was seen today for leg pain.    Diagnoses and all orders for this visit:    Acute pain of left knee    Pain today is much improved. Pt reports knee brace is helping him. Ok to continue use. Also can use tylenol or nsaid prn for pain. Pt declines xray and referral to orthopedics at this time. He reports he will notify MD if no improvement.       Follow-up if symptoms worsen or fail to improve.    Sole Conde MD  Internal Medicine    2/18/2019

## 2019-03-25 ENCOUNTER — LAB VISIT (OUTPATIENT)
Dept: LAB | Facility: HOSPITAL | Age: 24
End: 2019-03-25
Attending: INTERNAL MEDICINE
Payer: COMMERCIAL

## 2019-03-25 DIAGNOSIS — Z00.00 ANNUAL PHYSICAL EXAM: ICD-10-CM

## 2019-03-25 LAB
25(OH)D3+25(OH)D2 SERPL-MCNC: 14 NG/ML (ref 30–96)
ALBUMIN SERPL BCP-MCNC: 4.1 G/DL (ref 3.5–5.2)
ALP SERPL-CCNC: 55 U/L (ref 55–135)
ALT SERPL W/O P-5'-P-CCNC: 19 U/L (ref 10–44)
ANION GAP SERPL CALC-SCNC: 9 MMOL/L (ref 8–16)
AST SERPL-CCNC: 22 U/L (ref 10–40)
BASOPHILS # BLD AUTO: 0.05 K/UL (ref 0–0.2)
BASOPHILS NFR BLD: 0.7 % (ref 0–1.9)
BILIRUB SERPL-MCNC: 1.9 MG/DL (ref 0.1–1)
BUN SERPL-MCNC: 12 MG/DL (ref 6–20)
CALCIUM SERPL-MCNC: 9.5 MG/DL (ref 8.7–10.5)
CHLORIDE SERPL-SCNC: 106 MMOL/L (ref 95–110)
CO2 SERPL-SCNC: 27 MMOL/L (ref 23–29)
CREAT SERPL-MCNC: 1 MG/DL (ref 0.5–1.4)
DIFFERENTIAL METHOD: ABNORMAL
EOSINOPHIL # BLD AUTO: 0.2 K/UL (ref 0–0.5)
EOSINOPHIL NFR BLD: 2.6 % (ref 0–8)
ERYTHROCYTE [DISTWIDTH] IN BLOOD BY AUTOMATED COUNT: 12.2 % (ref 11.5–14.5)
EST. GFR  (AFRICAN AMERICAN): >60 ML/MIN/1.73 M^2
EST. GFR  (NON AFRICAN AMERICAN): >60 ML/MIN/1.73 M^2
GLUCOSE SERPL-MCNC: 89 MG/DL (ref 70–110)
HCT VFR BLD AUTO: 39.3 % (ref 40–54)
HGB BLD-MCNC: 13.4 G/DL (ref 14–18)
IMM GRANULOCYTES # BLD AUTO: 0.02 K/UL (ref 0–0.04)
IMM GRANULOCYTES NFR BLD AUTO: 0.3 % (ref 0–0.5)
LYMPHOCYTES # BLD AUTO: 2.9 K/UL (ref 1–4.8)
LYMPHOCYTES NFR BLD: 38.3 % (ref 18–48)
MCH RBC QN AUTO: 29.1 PG (ref 27–31)
MCHC RBC AUTO-ENTMCNC: 34.1 G/DL (ref 32–36)
MCV RBC AUTO: 85 FL (ref 82–98)
MONOCYTES # BLD AUTO: 0.7 K/UL (ref 0.3–1)
MONOCYTES NFR BLD: 8.9 % (ref 4–15)
NEUTROPHILS # BLD AUTO: 3.8 K/UL (ref 1.8–7.7)
NEUTROPHILS NFR BLD: 49.2 % (ref 38–73)
NRBC BLD-RTO: 0 /100 WBC
PLATELET # BLD AUTO: 231 K/UL (ref 150–350)
PMV BLD AUTO: 13.8 FL (ref 9.2–12.9)
POTASSIUM SERPL-SCNC: 4 MMOL/L (ref 3.5–5.1)
PROT SERPL-MCNC: 7.4 G/DL (ref 6–8.4)
RBC # BLD AUTO: 4.61 M/UL (ref 4.6–6.2)
SODIUM SERPL-SCNC: 142 MMOL/L (ref 136–145)
TSH SERPL DL<=0.005 MIU/L-ACNC: 1.52 UIU/ML (ref 0.4–4)
WBC # BLD AUTO: 7.67 K/UL (ref 3.9–12.7)

## 2019-03-25 PROCEDURE — 82306 VITAMIN D 25 HYDROXY: CPT

## 2019-03-25 PROCEDURE — 36415 COLL VENOUS BLD VENIPUNCTURE: CPT | Mod: PO

## 2019-03-25 PROCEDURE — 85025 COMPLETE CBC W/AUTO DIFF WBC: CPT

## 2019-03-25 PROCEDURE — 84443 ASSAY THYROID STIM HORMONE: CPT

## 2019-03-25 PROCEDURE — 80053 COMPREHEN METABOLIC PANEL: CPT

## 2019-04-01 ENCOUNTER — OFFICE VISIT (OUTPATIENT)
Dept: INTERNAL MEDICINE | Facility: CLINIC | Age: 24
End: 2019-04-01
Payer: COMMERCIAL

## 2019-04-01 VITALS
HEIGHT: 66 IN | WEIGHT: 170 LBS | HEART RATE: 80 BPM | BODY MASS INDEX: 27.32 KG/M2 | RESPIRATION RATE: 16 BRPM | DIASTOLIC BLOOD PRESSURE: 70 MMHG | SYSTOLIC BLOOD PRESSURE: 122 MMHG | TEMPERATURE: 99 F

## 2019-04-01 DIAGNOSIS — R79.89 ABNORMAL CBC: ICD-10-CM

## 2019-04-01 DIAGNOSIS — J45.20 ASTHMA, MILD INTERMITTENT, WELL-CONTROLLED: ICD-10-CM

## 2019-04-01 DIAGNOSIS — E55.9 VITAMIN D INSUFFICIENCY: ICD-10-CM

## 2019-04-01 DIAGNOSIS — Z00.00 ANNUAL PHYSICAL EXAM: Primary | ICD-10-CM

## 2019-04-01 PROCEDURE — 90714 TD VACCINE GREATER THAN OR EQUAL TO 7YO PRESERVATIVE FREE IM: ICD-10-PCS | Mod: S$GLB,,, | Performed by: INTERNAL MEDICINE

## 2019-04-01 PROCEDURE — 99999 PR PBB SHADOW E&M-EST. PATIENT-LVL III: ICD-10-PCS | Mod: PBBFAC,,, | Performed by: INTERNAL MEDICINE

## 2019-04-01 PROCEDURE — 90471 TD VACCINE GREATER THAN OR EQUAL TO 7YO PRESERVATIVE FREE IM: ICD-10-PCS | Mod: S$GLB,,, | Performed by: INTERNAL MEDICINE

## 2019-04-01 PROCEDURE — 99395 PR PREVENTIVE VISIT,EST,18-39: ICD-10-PCS | Mod: 25,S$GLB,, | Performed by: INTERNAL MEDICINE

## 2019-04-01 PROCEDURE — 90714 TD VACC NO PRESV 7 YRS+ IM: CPT | Mod: S$GLB,,, | Performed by: INTERNAL MEDICINE

## 2019-04-01 PROCEDURE — 99999 PR PBB SHADOW E&M-EST. PATIENT-LVL III: CPT | Mod: PBBFAC,,, | Performed by: INTERNAL MEDICINE

## 2019-04-01 PROCEDURE — 99395 PREV VISIT EST AGE 18-39: CPT | Mod: 25,S$GLB,, | Performed by: INTERNAL MEDICINE

## 2019-04-01 PROCEDURE — 90471 IMMUNIZATION ADMIN: CPT | Mod: S$GLB,,, | Performed by: INTERNAL MEDICINE

## 2019-04-01 RX ORDER — ERGOCALCIFEROL 1.25 MG/1
50000 CAPSULE ORAL
Qty: 4 CAPSULE | Refills: 2 | Status: SHIPPED | OUTPATIENT
Start: 2019-04-01 | End: 2019-07-08 | Stop reason: SDUPTHER

## 2019-04-01 NOTE — PROGRESS NOTES
Subjective:      Maria Ines Lea is a 23 y.o. male who presents for annual exam.      Family History:  family history includes Allergic rhinitis in his mother; Allergies in his maternal aunt and mother; Asthma in his maternal aunt, mother, and sister; Breast cancer in his maternal grandmother; Diabetes in his maternal grandmother and paternal grandmother; Eczema in his father; Glaucoma in his paternal grandmother; Glucose-6-phos deficiency in his father; Heart disease in his paternal grandmother; Hypertension in his maternal aunt and paternal grandmother; Migraines in his maternal aunt and mother; Other in his maternal uncle; Thyroid disease in his maternal grandmother.    Health Maintenance:  Health Maintenance       Date Due Completion Date    Pneumococcal Vaccine (Highest Risk) (1 of 3 - PCV13) 07/18/2016 ---    Influenza Vaccine 08/01/2018 12/29/2016    TETANUS VACCINE 08/05/2018 8/5/2008        Eye exam: yearly  Dental Exam: yearly    Influenza: not done  Tetanus: due    Body mass index is 27.43 kg/m².    Meds:   Current Outpatient Medications:     albuterol (PROAIR HFA) 90 mcg/actuation HFAA, Inhale 2 puffs into the lungs every 4 (four) hours as needed. 1 - 2 HFA Aerosol Inhaler Inhalation Every 4-6 hours, Disp: 3 Inhaler, Rfl: 2    ergocalciferol (ERGOCALCIFEROL) 50,000 unit Cap, Take 1 capsule (50,000 Units total) by mouth every 7 days., Disp: 4 capsule, Rfl: 2    fluticasone (FLONASE) 50 mcg/actuation nasal spray, 1 spray (50 mcg total) by Each Nare route 2 (two) times daily., Disp: 1 Bottle, Rfl: 3    inhalation device (AEROCHAMBER PLUS FLOW-VU), Use as directed for inhalation., Disp: 2 Device, Rfl: 0    PMHx:   Past Medical History:   Diagnosis Date    ADHD (attention deficit hyperactivity disorder)     diagnosed 4th gradem treated with Vyvanse at one time but patient has difficulty swallowing pills    ALLERGIC RHINITIS     Asthma     Asthma, well controlled 7/20/2012    Bronchitis     Eczema      Hemoglobin C trait        PSHx:  Past Surgical History:   Procedure Laterality Date    none         SocHx:   Social History     Socioeconomic History    Marital status: Single     Spouse name: None    Number of children: None    Years of education: None    Highest education level: None   Occupational History    None   Social Needs    Financial resource strain: None    Food insecurity:     Worry: None     Inability: None    Transportation needs:     Medical: None     Non-medical: None   Tobacco Use    Smoking status: Never Smoker    Smokeless tobacco: Never Used   Substance and Sexual Activity    Alcohol use: Yes     Frequency: Monthly or less     Drinks per session: 1 or 2     Binge frequency: Never    Drug use: No    Sexual activity: Not Currently     Partners: Female     Birth control/protection: Condom     Comment: partner receives depo   Lifestyle    Physical activity:     Days per week: None     Minutes per session: None    Stress: None   Relationships    Social connections:     Talks on phone: None     Gets together: None     Attends Protestant service: None     Active member of club or organization: None     Attends meetings of clubs or organizations: None     Relationship status: None    Intimate partner violence:     Fear of current or ex partner: None     Emotionally abused: None     Physically abused: None     Forced sexual activity: None   Other Topics Concern    None   Social History Narrative    Lives with parents and sister when she is home from college. Attends [employer] -     One dog.        Review of Systems   Constitutional: Positive for fatigue. Negative for chills, diaphoresis and fever.   HENT: Negative for congestion, dental problem, ear discharge, ear pain, postnasal drip, rhinorrhea, sinus pressure and sore throat.    Eyes: Negative for redness and visual disturbance.   Respiratory: Negative for cough, chest tightness, shortness of breath and wheezing.    Cardiovascular:  Negative for chest pain, palpitations and leg swelling.   Gastrointestinal: Negative for abdominal pain, constipation, diarrhea, nausea and vomiting.   Endocrine: Negative for polydipsia and polyuria.   Genitourinary: Negative for dysuria, frequency and hematuria.   Musculoskeletal: Positive for back pain (left knee) and neck pain. Negative for arthralgias and myalgias.        Episode of left heel pain but has resolved, no knee pain   Skin: Negative for rash.   Neurological: Negative for dizziness, weakness, numbness and headaches.   Hematological: Negative for adenopathy.   Psychiatric/Behavioral: Negative for sleep disturbance. The patient is not nervous/anxious.        Objective:      Physical Exam   Constitutional: He is oriented to person, place, and time. Vital signs are normal. He appears well-developed and well-nourished. No distress.   HENT:   Head: Normocephalic and atraumatic.   Right Ear: Hearing, tympanic membrane, external ear and ear canal normal. Tympanic membrane is not erythematous and not bulging.   Left Ear: Hearing, tympanic membrane, external ear and ear canal normal. Tympanic membrane is not erythematous and not bulging.   Nose: Nose normal.   Mouth/Throat: Uvula is midline, oropharynx is clear and moist and mucous membranes are normal. No oropharyngeal exudate or posterior oropharyngeal erythema.   Eyes: Pupils are equal, round, and reactive to light. Conjunctivae, EOM and lids are normal. No scleral icterus.   Neck: Normal range of motion. Neck supple. No thyroid mass and no thyromegaly present.   Cardiovascular: Normal rate, regular rhythm, normal heart sounds, intact distal pulses and normal pulses.   No murmur heard.  Pulmonary/Chest: Effort normal and breath sounds normal. He has no wheezes.   Abdominal: Soft. Bowel sounds are normal. He exhibits no distension. There is no hepatosplenomegaly. There is no tenderness. There is no rigidity, no rebound and no guarding.   Musculoskeletal:  Normal range of motion. He exhibits no edema.   Lymphadenopathy:     He has no cervical adenopathy.        Right: No supraclavicular adenopathy present.        Left: No supraclavicular adenopathy present.   Neurological: He is alert and oriented to person, place, and time. He has normal reflexes. He displays normal reflexes. Coordination and gait normal.   Skin: Skin is warm, dry and intact. No rash noted.   Psychiatric: He has a normal mood and affect.   Vitals reviewed.      Assessment:       1. Annual physical exam    2. Vitamin D insufficiency    3. Asthma, well controlled, unspecified asthma severity, unspecified whether persistent    4. Hemoglobin C trait    5. History of eczema    6. Abnormal CBC        Plan:       1. Annual physical exam  - reviewed recent labs with patient    2. Vitamin D insufficiency  - Vitamin D 50,000 IU weekly for 3 months  - Vitamin D; Future. Repeat in 3 months    3. Asthma, mild intermittent, well-controlled  - uses albuterol inhaler if needed    4. Abnormal CBC  - CBC auto differential; Future, repeat in 3 months      RTC in 1 year or sooner if needed      Mirtha Arguelles MD

## 2019-07-01 ENCOUNTER — LAB VISIT (OUTPATIENT)
Dept: LAB | Facility: HOSPITAL | Age: 24
End: 2019-07-01
Attending: INTERNAL MEDICINE
Payer: COMMERCIAL

## 2019-07-01 DIAGNOSIS — E55.9 VITAMIN D INSUFFICIENCY: ICD-10-CM

## 2019-07-01 DIAGNOSIS — R79.89 ABNORMAL CBC: ICD-10-CM

## 2019-07-01 LAB
25(OH)D3+25(OH)D2 SERPL-MCNC: 14 NG/ML (ref 30–96)
BASOPHILS # BLD AUTO: 0.06 K/UL (ref 0–0.2)
BASOPHILS NFR BLD: 0.7 % (ref 0–1.9)
DIFFERENTIAL METHOD: ABNORMAL
EOSINOPHIL # BLD AUTO: 0.3 K/UL (ref 0–0.5)
EOSINOPHIL NFR BLD: 3.1 % (ref 0–8)
ERYTHROCYTE [DISTWIDTH] IN BLOOD BY AUTOMATED COUNT: 12.2 % (ref 11.5–14.5)
HCT VFR BLD AUTO: 38.5 % (ref 40–54)
HGB BLD-MCNC: 13.2 G/DL (ref 14–18)
IMM GRANULOCYTES # BLD AUTO: 0.02 K/UL (ref 0–0.04)
IMM GRANULOCYTES NFR BLD AUTO: 0.2 % (ref 0–0.5)
LYMPHOCYTES # BLD AUTO: 3.7 K/UL (ref 1–4.8)
LYMPHOCYTES NFR BLD: 40.8 % (ref 18–48)
MCH RBC QN AUTO: 29.3 PG (ref 27–31)
MCHC RBC AUTO-ENTMCNC: 34.3 G/DL (ref 32–36)
MCV RBC AUTO: 86 FL (ref 82–98)
MONOCYTES # BLD AUTO: 0.9 K/UL (ref 0.3–1)
MONOCYTES NFR BLD: 9.6 % (ref 4–15)
NEUTROPHILS # BLD AUTO: 4.1 K/UL (ref 1.8–7.7)
NEUTROPHILS NFR BLD: 45.6 % (ref 38–73)
NRBC BLD-RTO: 0 /100 WBC
PLATELET # BLD AUTO: 220 K/UL (ref 150–350)
PMV BLD AUTO: 14.2 FL (ref 9.2–12.9)
RBC # BLD AUTO: 4.5 M/UL (ref 4.6–6.2)
WBC # BLD AUTO: 8.98 K/UL (ref 3.9–12.7)

## 2019-07-01 PROCEDURE — 36415 COLL VENOUS BLD VENIPUNCTURE: CPT | Mod: PO

## 2019-07-01 PROCEDURE — 85025 COMPLETE CBC W/AUTO DIFF WBC: CPT

## 2019-07-01 PROCEDURE — 82306 VITAMIN D 25 HYDROXY: CPT

## 2019-07-08 RX ORDER — ERGOCALCIFEROL 1.25 MG/1
50000 CAPSULE ORAL
Qty: 4 CAPSULE | Refills: 2 | Status: SHIPPED | OUTPATIENT
Start: 2019-07-08 | End: 2019-08-13 | Stop reason: SDUPTHER

## 2019-08-13 ENCOUNTER — PATIENT MESSAGE (OUTPATIENT)
Dept: INTERNAL MEDICINE | Facility: CLINIC | Age: 24
End: 2019-08-13

## 2019-08-13 RX ORDER — ERGOCALCIFEROL 1.25 MG/1
50000 CAPSULE ORAL
Qty: 12 CAPSULE | Refills: 1 | Status: SHIPPED | OUTPATIENT
Start: 2019-08-13 | End: 2020-09-09 | Stop reason: SDUPTHER

## 2019-08-13 NOTE — TELEPHONE ENCOUNTER
Pt requesting refill of:  ERGOCALCIFEROL    Last office visit:  4/1/19 (annual)    [facility] set for escript

## 2019-08-13 NOTE — TELEPHONE ENCOUNTER
Was taking Vit D every week, but didn't get refill because it was costly.    Wants to get back on Vitamin D    $12, mother paid $4    I'll call pharmacy tomorrow

## 2019-12-25 ENCOUNTER — OFFICE VISIT (OUTPATIENT)
Dept: URGENT CARE | Facility: CLINIC | Age: 24
End: 2019-12-25

## 2019-12-25 VITALS
RESPIRATION RATE: 20 BRPM | OXYGEN SATURATION: 98 % | SYSTOLIC BLOOD PRESSURE: 120 MMHG | TEMPERATURE: 101 F | HEART RATE: 118 BPM | BODY MASS INDEX: 27.32 KG/M2 | WEIGHT: 170 LBS | HEIGHT: 66 IN | DIASTOLIC BLOOD PRESSURE: 74 MMHG

## 2019-12-25 DIAGNOSIS — B34.9 ACUTE VIRAL SYNDROME: Primary | ICD-10-CM

## 2019-12-25 DIAGNOSIS — R50.9 FEVER, UNSPECIFIED FEVER CAUSE: ICD-10-CM

## 2019-12-25 PROCEDURE — 87804 INFLUENZA ASSAY W/OPTIC: CPT | Mod: QW,S$GLB,, | Performed by: EMERGENCY MEDICINE

## 2019-12-25 PROCEDURE — 99214 OFFICE O/P EST MOD 30 MIN: CPT | Mod: S$GLB,,, | Performed by: EMERGENCY MEDICINE

## 2019-12-25 PROCEDURE — 87804 POCT INFLUENZA A/B: ICD-10-PCS | Mod: QW,S$GLB,, | Performed by: EMERGENCY MEDICINE

## 2019-12-25 PROCEDURE — 87880 STREP A ASSAY W/OPTIC: CPT | Mod: QW,S$GLB,, | Performed by: EMERGENCY MEDICINE

## 2019-12-25 PROCEDURE — 87880 POCT RAPID STREP A: ICD-10-PCS | Mod: QW,S$GLB,, | Performed by: EMERGENCY MEDICINE

## 2019-12-25 PROCEDURE — 99214 PR OFFICE/OUTPT VISIT, EST, LEVL IV, 30-39 MIN: ICD-10-PCS | Mod: S$GLB,,, | Performed by: EMERGENCY MEDICINE

## 2019-12-25 RX ORDER — IBUPROFEN 200 MG
800 TABLET ORAL
Status: COMPLETED | OUTPATIENT
Start: 2019-12-25 | End: 2019-12-25

## 2019-12-25 RX ADMIN — Medication 800 MG: at 03:12

## 2019-12-25 NOTE — PATIENT INSTRUCTIONS
"Over the Counter Medications for Upper Respiratory Infection:    1. Ibuprofen 800 mg every 8 hours. May alternate with tylenol 1000 mg every 4 hours. (Do not take tylenol with other sources of acetaminophen such as Dayquil)  2. Zyrtec or Claritin 10 mg daily  3. Delsym or Dayquil for cough and congestion  4. Flonase for congestion and sinus pressure        Viral Syndrome (Adult)  A viral illness may cause a number of symptoms. The symptoms depend on the part of the body that the virus affects. If it settles in your nose, throat, and lungs, it may cause cough, sore throat, congestion, and sometimes headache. If it settles in your stomach and intestinal tract, it may cause vomiting and diarrhea. Sometimes it causes vague symptoms like "aching all over," feeling tired, loss of appetite, or fever.  A viral illness usually lasts 1 to 2 weeks, but sometimes it lasts longer. In some cases, a more serious infection can look like a viral syndrome in the first few days of the illness. You may need another exam and additional tests to know the difference. Watch for the warning signs listed below.  Home care  Follow these guidelines for taking care of yourself at home:  · If symptoms are severe, rest at home for the first 2 to 3 days.  · Stay away from cigarette smoke - both your smoke and the smoke from others.  · You may use over-the-counter acetaminophen or ibuprofen for fever, muscle aching, and headache, unless another medicine was prescribed for this. If you have chronic liver or kidney disease or ever had a stomach ulcer or GI bleeding, talk with your doctor before using these medicines. No one who is younger than 18 and ill with a fever should take aspirin. It may cause severe disease or death.  · Your appetite may be poor, so a light diet is fine. Avoid dehydration by drinking 8 to 12 8-ounce glasses of fluids each day. This may include water; orange juice; lemonade; apple, grape, and cranberry juice; clear fruit " drinks; electrolyte replacement and sports drinks; and decaffeinated teas and coffee. If you have been diagnosed with a kidney disease, ask your doctor how much and what types of fluids you should drink to prevent dehydration. If you have kidney disease, drinking too much fluid can cause it build up in the your body and be dangerous to your health.  · Over-the-counter remedies won't shorten the length of the illness but may be helpful for cough, sore throat; and nasal and sinus congestion. Don't use decongestants if you have high blood pressure.  Follow-up care  Follow up with your healthcare provider if you do not improve over the next week.  Call 911  Get emergency medical care if any of the following occur:  · Convulsion  · Feeling weak, dizzy, or like you are going to faint  · Chest pain, shortness of breath, wheezing, or difficulty breathing  When to seek medical advice  Call your healthcare provider right away if any of these occur:  · Cough with lots of colored sputum (mucus) or blood in your sputum  · Chest pain, shortness of breath, wheezing, or difficulty breathing  · Severe headache; face, neck, or ear pain  · Severe, constant pain in the lower right side of your belly (abdominal)  · Continued vomiting (cant keep liquids down)  · Frequent diarrhea (more than 5 times a day); blood (red or black color) or mucus in diarrhea  · Feeling weak, dizzy, or like you are going to faint  · Extreme thirst  · Fever of 100.4°F (38°C) or higher, or as directed by your healthcare provider  Date Last Reviewed: 9/25/2015  © 2474-7134 [employer]. [address] [place] [address], [place] [address]. All rights reserved. This information is not intended as a substitute for professional medical care. Always follow your healthcare professional's instructions.

## 2019-12-25 NOTE — PROGRESS NOTES
"[facility] - Visit Note                                           Chief Complaint  24 y.o. male with Sore Throat    History of Present Illness  Maria Ines Lea presents to the urgent care with complaints of cough, congestion, fever, and body aches.  Patient reports sick for several days.  He did not know that he had fever until he got here.  He has only use Flonase for symptoms.    Past Medical History:   Diagnosis Date    ADHD (attention deficit hyperactivity disorder)     diagnosed 4th gradem treated with Vyvanse at one time but patient has difficulty swallowing pills    ALLERGIC RHINITIS     Asthma     Asthma, well controlled 7/20/2012    Bronchitis     Eczema     Hemoglobin C trait      Past Surgical History:   Procedure Laterality Date    none        Review of patient's allergies indicates:  No Known Allergies     Review of Systems and Physical Exam     Review of Systems  -- Constitution - reports fever, no weight loss, no loss of consciousness  -- Eyes - no changes in vision, no redness, no swelling, no discharge  -- Ear, Nose - no  earache, no loss of hearing, no epistaxis  -- Mouth,Throat - reports sore throat, no toothache, normal voice, normal swallowing  -- Respiratory - reports cough and congestion, no shortness of breath, no wheezing, no increased WOB   -- Cardiovascular - denies chest pain, no palpitations, no lower extremity edema  -- Gastrointestinal - denies abdominal pain, denies nausea, vomiting, and diarrhea  -- Genitourinary - no dysuria, denies flank pain, no hematuria or frequency   -- Musculoskeletal - denies back pain, reports general body aches  -- Neurological - no headache, no neurologic changes, no loss of bladder or bowel function no seizure like activity, no changes in hearing or vision  -- Skin - denies skin changes, no rash, no hives, no suspected skin infection    Vital Signs   height is 5' 6" (1.676 m) and weight is 77.1 kg (169 lb 15.6 oz). His temperature is " 100.9 °F (38.3 °C) (abnormal). His blood pressure is 120/74 and his pulse is 118 (abnormal). His respiration is 20 and oxygen saturation is 98%.      Physical Exam  -- Nursing note and vitals reviewed - patient has a fever of 100.9 and heart rate of 118  -- Constitutional:  Awake alert and oriented, GCS 15, no acute distress.  Appears well.  -- Head: Atraumatic. Normocephalic. No obvious abnormality  -- Eyes: Pupils are equal and reactive to light. Extraocular movements intact. No nystagmus.  No periorbital swelling. Normal conjunctiva.  -- Nose: Nose grossly normal in appearance, nares grossly normal. No rhinorrhea.  -- Throat: Mucous membranes moist, pharynx normal, normal tonsils.  Airway patent.  -- Ears: External ears and TM normal bilaterally. Normal hearing.   -- Neck: Normal range of motion. Neck supple. No meningismus. No adenopathy  -- Cardiac: Normal rate, regular rhythm and normal heart sounds. No carotid bruit. No lower extremity edema.  -- Pulmonary: Normal respiratory effort, breath sounds equal bilaterally. Adequate flow.  No wheezing.  No crackles. No increased work of breathing  -- Abdominal: Soft, no tenderness, no guarding, no rebound. Normal bowel sounds.   -- Musculoskeletal: Normal range of motion, all 4 extremities 5/5 strength.  Neurovascularly intact. Atraumatic. No deformities.  -- Neurological:  Cranial nerves 2-12 grossly intact. No focal deficits.   -- Vascular: Posterior tibial, dorsalis pedis and radial pulses 2+ bilaterally    -- Lymphatics: No cervical or peripheral lymphadenopathy.   -- Skin: Warm and dry. No evidence of rash or cellulitis  -- Psychiatric: Normal mood and affect. Bedside behavior is appropriate.  Patient is cooperative.  Denies suicidal homicidal ideation.    Emergency Room Course     Treatment Course, Evaluation, and Medical Decision Makin.  Physical exam unremarkable except for fever and elevated heart rate  2.  Influenza negative  3.  Strep negative  4.   Ibuprofen 100 mg p.o.  5.  Discharge home follow up primary care      Diagnosis  -- viral syndrome    Disposition and Plan  -- Disposition: home  -- Condition: stable  -- Follow-up: Patient to follow up with Mirtha Arguelles MD in 1-2 days, and any specialists noted on discharge paperwork  -- I advised the patient that we have found no life threatening condition today and have provided recommendations his/her care  -- At this time, I believe the patient is clinically stable for discharge.   -- The patient acknowledges that ongoing follow up with a MD is required   -- Patient agrees to comply with all instruction and direction given in the urgent care  -- Patient counseled on strict return precautions as discussed

## 2020-09-04 ENCOUNTER — TELEPHONE (OUTPATIENT)
Dept: INTERNAL MEDICINE | Facility: CLINIC | Age: 25
End: 2020-09-04

## 2020-09-08 ENCOUNTER — TELEPHONE (OUTPATIENT)
Dept: INTERNAL MEDICINE | Facility: CLINIC | Age: 25
End: 2020-09-08

## 2020-09-09 ENCOUNTER — OFFICE VISIT (OUTPATIENT)
Dept: INTERNAL MEDICINE | Facility: CLINIC | Age: 25
End: 2020-09-09
Payer: COMMERCIAL

## 2020-09-09 DIAGNOSIS — Z00.00 ANNUAL PHYSICAL EXAM: Primary | ICD-10-CM

## 2020-09-09 DIAGNOSIS — E55.9 VITAMIN D INSUFFICIENCY: ICD-10-CM

## 2020-09-09 DIAGNOSIS — R53.83 FATIGUE, UNSPECIFIED TYPE: Primary | ICD-10-CM

## 2020-09-09 DIAGNOSIS — R79.89 ABNORMAL CBC: ICD-10-CM

## 2020-09-09 DIAGNOSIS — M25.532 LEFT WRIST PAIN: ICD-10-CM

## 2020-09-09 PROCEDURE — 99213 OFFICE O/P EST LOW 20 MIN: CPT | Mod: 95,,, | Performed by: INTERNAL MEDICINE

## 2020-09-09 PROCEDURE — 99213 PR OFFICE/OUTPT VISIT, EST, LEVL III, 20-29 MIN: ICD-10-PCS | Mod: 95,,, | Performed by: INTERNAL MEDICINE

## 2020-09-09 RX ORDER — ERGOCALCIFEROL 1.25 MG/1
50000 CAPSULE ORAL
Qty: 12 CAPSULE | Refills: 1 | Status: SHIPPED | OUTPATIENT
Start: 2020-09-09 | End: 2022-02-04 | Stop reason: SDUPTHER

## 2020-09-09 NOTE — PROGRESS NOTES
Primary Care Telemedicine Note    The patient location is:  Home   The chief complaint leading to consultation is: dizziness, fatigue  Total time spent with patient: 15 minutes      Visit type: Virtual visit with synchronous audio only and video  Each patient to whom he or she provides medical services by telemedicine is:  (1) informed of the relationship between the physician and patient and the respective role of any other health care provider with respect to management of the patient; and (2) notified that he or she may decline to receive medical services by telemedicine and may withdraw from such care at any time.    Subjective:      Patient ID: Maria Ines Lea is a 25 y.o. male.    Chief Complaint: Fatigue    Fatigue  This is a chronic problem. The current episode started more than 1 month ago. The problem occurs constantly. The problem has been unchanged. Associated symptoms include arthralgias (left wrist pain, uses computer frequently and finds that the frequent use of a game controller leads to worsening pain, pain started 1 month ago) and fatigue. Pertinent negatives include no abdominal pain, chest pain, chills, congestion, coughing, diaphoresis, fever, headaches, myalgias, sore throat, swollen glands, vomiting or weakness. Nothing aggravates the symptoms. He has tried rest for the symptoms. The treatment provided no relief.      He has a history of anemia and vitamin D deficiency. He was upset that the cost of Vitamin D was different for him but a lot cheaper for his mother so he did not start Vitamin D. He denies any significant dietary changes. Sleep habits are poor at times. He will remain awake throughout the night so that he can livestream. He also works for [employer] and usually works between 8am and 12pm and resumes from 5pm to 10pm. He reports feeling dizzy when he is awakened abruptly with sleep/      Review of Systems   Constitutional: Positive for fatigue. Negative for chills, diaphoresis  and fever.   HENT: Negative for congestion, rhinorrhea and sore throat.    Respiratory: Negative for cough and shortness of breath.    Cardiovascular: Negative for chest pain and palpitations.   Gastrointestinal: Negative for abdominal pain, constipation, diarrhea and vomiting.   Musculoskeletal: Positive for arthralgias (left wrist pain, uses computer frequently and finds that the frequent use of a game controller leads to worsening pain, pain started 1 month ago). Negative for myalgias.   Neurological: Negative for tremors, weakness and headaches.   Psychiatric/Behavioral: Positive for sleep disturbance (sleep habits are erratic). The patient is not nervous/anxious.        Objective:      Physical Exam  Constitutional:       Appearance: Normal appearance.   HENT:      Head: Normocephalic and atraumatic.      Right Ear: Hearing normal.      Left Ear: Hearing normal.   Eyes:      General: Lids are normal.   Musculoskeletal: Normal range of motion.   Skin:     General: Skin is dry.   Neurological:      Mental Status: He is alert and oriented to person, place, and time.      Motor: No weakness.   Psychiatric:         Attention and Perception: Attention normal.         Mood and Affect: Mood normal.         Speech: Speech normal.         Assessment:       1. Fatigue, unspecified type    2. Vitamin D insufficiency    3. Abnormal CBC    4. Left wrist pain        Plan:       1. Fatigue, unspecified type  - check cbc, iron studies, vitamin D    2. Vitamin D insufficiency  - check vitamin D  - refilled Vitamin D 50,000 IU weekly but if cost is too high, patient may take OTC Vitamin D3 10,000 IU (2 of the 5000 IU caps) every day    3. Abnormal CBC  - check CBC    4. Left wrist pain  - will evaluate patient in clinic next week    RTC in 1 week for annual exam     Mirtha Arguelles MD

## 2020-09-10 ENCOUNTER — LAB VISIT (OUTPATIENT)
Dept: LAB | Facility: OTHER | Age: 25
End: 2020-09-10
Attending: INTERNAL MEDICINE
Payer: COMMERCIAL

## 2020-09-10 DIAGNOSIS — Z00.00 ANNUAL PHYSICAL EXAM: ICD-10-CM

## 2020-09-10 LAB
ALBUMIN SERPL BCP-MCNC: 4.4 G/DL (ref 3.5–5.2)
ALP SERPL-CCNC: 57 U/L (ref 55–135)
ALT SERPL W/O P-5'-P-CCNC: 14 U/L (ref 10–44)
ANION GAP SERPL CALC-SCNC: 10 MMOL/L (ref 8–16)
AST SERPL-CCNC: 16 U/L (ref 10–40)
BASOPHILS # BLD AUTO: 0.04 K/UL (ref 0–0.2)
BASOPHILS NFR BLD: 0.7 % (ref 0–1.9)
BILIRUB SERPL-MCNC: 1.8 MG/DL (ref 0.1–1)
BUN SERPL-MCNC: 11 MG/DL (ref 6–20)
CALCIUM SERPL-MCNC: 9.3 MG/DL (ref 8.7–10.5)
CHLORIDE SERPL-SCNC: 106 MMOL/L (ref 95–110)
CO2 SERPL-SCNC: 27 MMOL/L (ref 23–29)
CREAT SERPL-MCNC: 1 MG/DL (ref 0.5–1.4)
DIFFERENTIAL METHOD: ABNORMAL
EOSINOPHIL # BLD AUTO: 0.3 K/UL (ref 0–0.5)
EOSINOPHIL NFR BLD: 4.1 % (ref 0–8)
ERYTHROCYTE [DISTWIDTH] IN BLOOD BY AUTOMATED COUNT: 12.1 % (ref 11.5–14.5)
EST. GFR  (AFRICAN AMERICAN): >60 ML/MIN/1.73 M^2
EST. GFR  (NON AFRICAN AMERICAN): >60 ML/MIN/1.73 M^2
GLUCOSE SERPL-MCNC: 87 MG/DL (ref 70–110)
HCT VFR BLD AUTO: 40.7 % (ref 40–54)
HGB BLD-MCNC: 13.7 G/DL (ref 14–18)
IMM GRANULOCYTES # BLD AUTO: 0.01 K/UL (ref 0–0.04)
IMM GRANULOCYTES NFR BLD AUTO: 0.2 % (ref 0–0.5)
LYMPHOCYTES # BLD AUTO: 2.8 K/UL (ref 1–4.8)
LYMPHOCYTES NFR BLD: 45.7 % (ref 18–48)
MCH RBC QN AUTO: 28.9 PG (ref 27–31)
MCHC RBC AUTO-ENTMCNC: 33.7 G/DL (ref 32–36)
MCV RBC AUTO: 86 FL (ref 82–98)
MONOCYTES # BLD AUTO: 0.6 K/UL (ref 0.3–1)
MONOCYTES NFR BLD: 9.5 % (ref 4–15)
NEUTROPHILS # BLD AUTO: 2.4 K/UL (ref 1.8–7.7)
NEUTROPHILS NFR BLD: 39.8 % (ref 38–73)
NRBC BLD-RTO: 0 /100 WBC
PLATELET # BLD AUTO: 221 K/UL (ref 150–350)
PLATELET BLD QL SMEAR: ABNORMAL
PMV BLD AUTO: 14.6 FL (ref 9.2–12.9)
POTASSIUM SERPL-SCNC: 3.5 MMOL/L (ref 3.5–5.1)
PROT SERPL-MCNC: 7.6 G/DL (ref 6–8.4)
RBC # BLD AUTO: 4.74 M/UL (ref 4.6–6.2)
RETICS/RBC NFR AUTO: 1.6 % (ref 0.4–2)
SODIUM SERPL-SCNC: 143 MMOL/L (ref 136–145)
TSH SERPL DL<=0.005 MIU/L-ACNC: 0.72 UIU/ML (ref 0.4–4)
WBC # BLD AUTO: 6.11 K/UL (ref 3.9–12.7)

## 2020-09-10 PROCEDURE — 36415 COLL VENOUS BLD VENIPUNCTURE: CPT

## 2020-09-10 PROCEDURE — 80053 COMPREHEN METABOLIC PANEL: CPT

## 2020-09-10 PROCEDURE — 82306 VITAMIN D 25 HYDROXY: CPT

## 2020-09-10 PROCEDURE — 83540 ASSAY OF IRON: CPT

## 2020-09-10 PROCEDURE — 85045 AUTOMATED RETICULOCYTE COUNT: CPT

## 2020-09-10 PROCEDURE — 82728 ASSAY OF FERRITIN: CPT

## 2020-09-10 PROCEDURE — 85025 COMPLETE CBC W/AUTO DIFF WBC: CPT

## 2020-09-10 PROCEDURE — 84443 ASSAY THYROID STIM HORMONE: CPT

## 2020-09-11 LAB
25(OH)D3+25(OH)D2 SERPL-MCNC: 9 NG/ML (ref 30–96)
FERRITIN SERPL-MCNC: 91 NG/ML (ref 20–300)
IRON SERPL-MCNC: 154 UG/DL (ref 45–160)
SATURATED IRON: 46 % (ref 20–50)
TOTAL IRON BINDING CAPACITY: 334 UG/DL (ref 250–450)
TRANSFERRIN SERPL-MCNC: 226 MG/DL (ref 200–375)

## 2020-10-08 ENCOUNTER — OFFICE VISIT (OUTPATIENT)
Dept: INTERNAL MEDICINE | Facility: CLINIC | Age: 25
End: 2020-10-08
Payer: COMMERCIAL

## 2020-10-08 VITALS
SYSTOLIC BLOOD PRESSURE: 120 MMHG | HEART RATE: 70 BPM | RESPIRATION RATE: 18 BRPM | BODY MASS INDEX: 27.63 KG/M2 | WEIGHT: 171.94 LBS | TEMPERATURE: 98 F | HEIGHT: 66 IN | OXYGEN SATURATION: 97 % | DIASTOLIC BLOOD PRESSURE: 72 MMHG

## 2020-10-08 DIAGNOSIS — D58.2 HEMOGLOBIN C TRAIT: ICD-10-CM

## 2020-10-08 DIAGNOSIS — M25.532 LEFT WRIST PAIN: ICD-10-CM

## 2020-10-08 DIAGNOSIS — E55.9 VITAMIN D INSUFFICIENCY: ICD-10-CM

## 2020-10-08 DIAGNOSIS — J45.20 ASTHMA, MILD INTERMITTENT, WELL-CONTROLLED: ICD-10-CM

## 2020-10-08 DIAGNOSIS — Z00.00 ANNUAL PHYSICAL EXAM: Primary | ICD-10-CM

## 2020-10-08 PROCEDURE — 90686 FLU VACCINE (QUAD) GREATER THAN OR EQUAL TO 3YO PRESERVATIVE FREE IM: ICD-10-PCS | Mod: S$GLB,,, | Performed by: INTERNAL MEDICINE

## 2020-10-08 PROCEDURE — 99395 PR PREVENTIVE VISIT,EST,18-39: ICD-10-PCS | Mod: 25,S$GLB,, | Performed by: INTERNAL MEDICINE

## 2020-10-08 PROCEDURE — 99999 PR PBB SHADOW E&M-EST. PATIENT-LVL IV: CPT | Mod: PBBFAC,,, | Performed by: INTERNAL MEDICINE

## 2020-10-08 PROCEDURE — 90471 IMMUNIZATION ADMIN: CPT | Mod: S$GLB,,, | Performed by: INTERNAL MEDICINE

## 2020-10-08 PROCEDURE — 90471 FLU VACCINE (QUAD) GREATER THAN OR EQUAL TO 3YO PRESERVATIVE FREE IM: ICD-10-PCS | Mod: S$GLB,,, | Performed by: INTERNAL MEDICINE

## 2020-10-08 PROCEDURE — 99999 PR PBB SHADOW E&M-EST. PATIENT-LVL IV: ICD-10-PCS | Mod: PBBFAC,,, | Performed by: INTERNAL MEDICINE

## 2020-10-08 PROCEDURE — 90686 IIV4 VACC NO PRSV 0.5 ML IM: CPT | Mod: S$GLB,,, | Performed by: INTERNAL MEDICINE

## 2020-10-08 PROCEDURE — 99395 PREV VISIT EST AGE 18-39: CPT | Mod: 25,S$GLB,, | Performed by: INTERNAL MEDICINE

## 2020-10-08 NOTE — PROGRESS NOTES
Subjective:      Maria Ines Lea is a 25 y.o. male who presents for annual exam.    Family History:  family history includes Allergic rhinitis in his mother; Allergies in his maternal aunt and mother; Asthma in his maternal aunt, mother, and sister; Breast cancer in his maternal grandmother; Diabetes in his maternal grandmother and paternal grandmother; Eczema in his father; Glaucoma in his paternal grandmother; Glucose-6-phos deficiency in his father; Heart disease in his paternal grandmother; Hypertension in his maternal aunt and paternal grandmother; Migraines in his maternal aunt and mother; Other in his maternal uncle; Thyroid disease in his maternal grandmother.    Health Maintenance:  Health Maintenance       Date Due Completion Date    Pneumococcal Vaccine (Highest Risk) (1 of 3 - PCV13) 07/18/2016 ---    Influenza Vaccine (1) 08/01/2020 12/29/2016    TETANUS VACCINE 04/01/2029 4/1/2019        Eye exam: yearly  Dental Exam: yearly   Influenza: due  Tetanus: 2019    Body mass index is 27.75 kg/m².    Wt Readings from Last 3 Encounters:   10/08/20 78 kg (171 lb 15.3 oz)   12/25/19 77.1 kg (169 lb 15.6 oz)   04/01/19 77.1 kg (169 lb 15.6 oz)       Meds:   Current Outpatient Medications:     albuterol (PROAIR HFA) 90 mcg/actuation HFAA, Inhale 2 puffs into the lungs every 4 (four) hours as needed. 1 - 2 HFA Aerosol Inhaler Inhalation Every 4-6 hours, Disp: 3 Inhaler, Rfl: 2    ergocalciferol (ERGOCALCIFEROL) 50,000 unit Cap, Take 1 capsule (50,000 Units total) by mouth every 7 days., Disp: 12 capsule, Rfl: 1    fluticasone (FLONASE) 50 mcg/actuation nasal spray, 1 spray (50 mcg total) by Each Nare route 2 (two) times daily., Disp: 1 Bottle, Rfl: 3    inhalation device (AEROCHAMBER PLUS FLOW-VU), Use as directed for inhalation., Disp: 2 Device, Rfl: 0    PMHx:   Past Medical History:   Diagnosis Date    ADHD (attention deficit hyperactivity disorder)     diagnosed 4th gradem treated with Vyvanse at one  time but patient has difficulty swallowing pills    ALLERGIC RHINITIS     Asthma     Asthma, well controlled 7/20/2012    Bronchitis     Eczema     Hemoglobin C trait        PSHx:  Past Surgical History:   Procedure Laterality Date    none         SocHx:   Social History     Socioeconomic History    Marital status: Single     Spouse name: Not on file    Number of children: Not on file    Years of education: Not on file    Highest education level: Not on file   Occupational History    Not on file   Social Needs    Financial resource strain: Not on file    Food insecurity     Worry: Not on file     Inability: Not on file    Transportation needs     Medical: No     Non-medical: No   Tobacco Use    Smoking status: Never Smoker    Smokeless tobacco: Never Used   Substance and Sexual Activity    Alcohol use: Yes     Frequency: Monthly or less     Drinks per session: 1 or 2     Binge frequency: Never    Drug use: No    Sexual activity: Not Currently     Partners: Female     Birth control/protection: Condom     Comment: partner receives depo   Lifestyle    Physical activity     Days per week: Not on file     Minutes per session: Not on file    Stress: Only a little   Relationships    Social connections     Talks on phone: More than three times a week     Gets together: Patient refused     Attends Baptism service: Not on file     Active member of club or organization: No     Attends meetings of clubs or organizations: Never     Relationship status: Never    Other Topics Concern    Not on file   Social History Narrative    Lives with parents and sister when she is home from college. Attends [employer] -     One dog.        Review of Systems   Constitutional: Negative for activity change, chills, diaphoresis, fever and unexpected weight change.   HENT: Negative for congestion, dental problem, ear pain, hearing loss, postnasal drip, rhinorrhea, sinus pressure, sore throat and trouble swallowing.     Eyes: Negative for discharge, redness and visual disturbance.   Respiratory: Negative for cough, chest tightness, shortness of breath and wheezing.    Cardiovascular: Negative for chest pain, palpitations and leg swelling.   Gastrointestinal: Negative for abdominal pain, blood in stool, constipation, diarrhea, nausea and vomiting.   Endocrine: Negative for polydipsia and polyuria.   Genitourinary: Negative for difficulty urinating, dysuria, frequency, hematuria and urgency.   Musculoskeletal: Positive for arthralgias. Negative for joint swelling, myalgias and neck pain.        Right thumb pain for a few weeks but it resolved. He does not recall any injuries. Left wrist pain is worse with typing and playing video games. Started using a brace and reports decrease in pain. Wears the brace during the night.   Skin: Negative for rash and wound.   Neurological: Negative for dizziness, weakness, numbness and headaches.   Hematological: Negative for adenopathy.   Psychiatric/Behavioral: Negative for confusion, dysphoric mood and sleep disturbance. The patient is not nervous/anxious.          Answers for HPI/ROS submitted by the patient on 10/8/2020   activity change: No  unexpected weight change: No  neck pain: No  hearing loss: No  rhinorrhea: No  trouble swallowing: No  eye discharge: No  visual disturbance: No  chest tightness: No  wheezing: No  chest pain: No  palpitations: No  blood in stool: No  constipation: No  vomiting: No  diarrhea: No  polydipsia: No  polyuria: No  difficulty urinating: No  urgency: No  hematuria: No  joint swelling: No  arthralgias: Yes  headaches: No  weakness: No  confusion: No  dysphoric mood: No      Objective:      Physical Exam  Vitals signs reviewed.   Constitutional:       General: He is not in acute distress.     Appearance: He is well-developed.   HENT:      Head: Normocephalic and atraumatic.      Right Ear: Hearing, tympanic membrane, ear canal and external ear normal. Tympanic  membrane is not erythematous or bulging.      Left Ear: Hearing, tympanic membrane, ear canal and external ear normal. Tympanic membrane is not erythematous or bulging.      Nose: Nose normal.      Mouth/Throat:      Mouth: Mucous membranes are moist.      Pharynx: Uvula midline. No oropharyngeal exudate or posterior oropharyngeal erythema.   Eyes:      General: Lids are normal. No scleral icterus.     Conjunctiva/sclera: Conjunctivae normal.      Pupils: Pupils are equal, round, and reactive to light.   Neck:      Musculoskeletal: Normal range of motion and neck supple.      Thyroid: No thyroid mass or thyromegaly.   Cardiovascular:      Rate and Rhythm: Normal rate and regular rhythm.      Pulses: Normal pulses.      Heart sounds: Normal heart sounds. No murmur.   Pulmonary:      Effort: Pulmonary effort is normal.      Breath sounds: Normal breath sounds. No wheezing.   Abdominal:      General: Bowel sounds are normal. There is no distension.      Palpations: Abdomen is soft. Abdomen is not rigid.      Tenderness: There is no abdominal tenderness. There is no guarding or rebound.   Musculoskeletal: Normal range of motion.      Left wrist: He exhibits no tenderness and no bony tenderness.      Right hand: He exhibits no tenderness, no bony tenderness and no swelling.   Lymphadenopathy:      Cervical: No cervical adenopathy.      Upper Body:      Right upper body: No supraclavicular adenopathy.      Left upper body: No supraclavicular adenopathy.   Skin:     General: Skin is warm and dry.      Findings: No rash.   Neurological:      Mental Status: He is alert and oriented to person, place, and time.      Coordination: Coordination normal.      Gait: Gait normal.      Deep Tendon Reflexes: Reflexes are normal and symmetric. Reflexes normal.   Psychiatric:         Attention and Perception: Attention normal.         Mood and Affect: Mood normal.         Assessment:       1. Annual physical exam    2. Vitamin D  insufficiency    3. Left wrist pain    4. Asthma, mild intermittent, well-controlled    5. Hemoglobin C trait        Plan:       1. Annual physical exam  - reviewed recent labs with patient  - Influenza - Quadrivalent (PF)  - elevated bilirubin is likely hereditary    2. Vitamin D insufficiency  - continue vitamin-D 07272 IU weekly    3. Left wrist pain  - likely related to either carpal tunnel or wrist tendinitis  - continue to use brace as needed, call us if symptoms worsen    4. Asthma, mild intermittent, well-controlled  - symptoms are well controlled, has not required albuterol use in months, continue to monitor    5. Hemoglobin C trait  - mild anemia that is stable    RTC in 1 year for annual exam    Mirtha Arguelles MD  Internal Medicine, [facility]

## 2021-03-20 ENCOUNTER — IMMUNIZATION (OUTPATIENT)
Dept: PRIMARY CARE CLINIC | Facility: CLINIC | Age: 26
End: 2021-03-20

## 2021-03-20 DIAGNOSIS — Z23 NEED FOR VACCINATION: Primary | ICD-10-CM

## 2021-03-20 PROCEDURE — 91300 PR SARS-COV- 2 COVID-19 VACCINE, NO PRSV, 30MCG/0.3ML, IM: ICD-10-PCS | Mod: S$GLB,,, | Performed by: INTERNAL MEDICINE

## 2021-03-20 PROCEDURE — 0001A PR IMMUNIZ ADMIN, SARS-COV-2 COVID-19 VACC, 30MCG/0.3ML, 1ST DOSE: CPT | Mod: CV19,S$GLB,, | Performed by: INTERNAL MEDICINE

## 2021-03-20 PROCEDURE — 0001A PR IMMUNIZ ADMIN, SARS-COV-2 COVID-19 VACC, 30MCG/0.3ML, 1ST DOSE: ICD-10-PCS | Mod: CV19,S$GLB,, | Performed by: INTERNAL MEDICINE

## 2021-03-20 PROCEDURE — 91300 PR SARS-COV- 2 COVID-19 VACCINE, NO PRSV, 30MCG/0.3ML, IM: CPT | Mod: S$GLB,,, | Performed by: INTERNAL MEDICINE

## 2021-03-20 RX ADMIN — Medication 0.3 ML: at 07:03

## 2021-04-11 ENCOUNTER — IMMUNIZATION (OUTPATIENT)
Dept: PRIMARY CARE CLINIC | Facility: CLINIC | Age: 26
End: 2021-04-11
Payer: COMMERCIAL

## 2021-04-11 DIAGNOSIS — Z23 NEED FOR VACCINATION: Primary | ICD-10-CM

## 2021-04-11 PROCEDURE — 0002A PR IMMUNIZ ADMIN, SARS-COV-2 COVID-19 VACC, 30MCG/0.3ML, 2ND DOSE: ICD-10-PCS | Mod: CV19,S$GLB,, | Performed by: INTERNAL MEDICINE

## 2021-04-11 PROCEDURE — 91300 PR SARS-COV- 2 COVID-19 VACCINE, NO PRSV, 30MCG/0.3ML, IM: ICD-10-PCS | Mod: S$GLB,,, | Performed by: INTERNAL MEDICINE

## 2021-04-11 PROCEDURE — 91300 PR SARS-COV- 2 COVID-19 VACCINE, NO PRSV, 30MCG/0.3ML, IM: CPT | Mod: S$GLB,,, | Performed by: INTERNAL MEDICINE

## 2021-04-11 PROCEDURE — 0002A PR IMMUNIZ ADMIN, SARS-COV-2 COVID-19 VACC, 30MCG/0.3ML, 2ND DOSE: CPT | Mod: CV19,S$GLB,, | Performed by: INTERNAL MEDICINE

## 2021-04-11 RX ADMIN — Medication 0.3 ML: at 07:04

## 2022-01-25 ENCOUNTER — PATIENT MESSAGE (OUTPATIENT)
Dept: ADMINISTRATIVE | Facility: HOSPITAL | Age: 27
End: 2022-01-25
Payer: COMMERCIAL

## 2022-01-26 ENCOUNTER — IMMUNIZATION (OUTPATIENT)
Dept: INTERNAL MEDICINE | Facility: CLINIC | Age: 27
End: 2022-01-26
Payer: COMMERCIAL

## 2022-01-26 DIAGNOSIS — Z23 NEED FOR VACCINATION: Primary | ICD-10-CM

## 2022-01-26 PROCEDURE — 0004A COVID-19, MRNA, LNP-S, PF, 30 MCG/0.3 ML DOSE VACCINE: CPT | Mod: PBBFAC | Performed by: INTERNAL MEDICINE

## 2022-02-04 ENCOUNTER — OFFICE VISIT (OUTPATIENT)
Dept: INTERNAL MEDICINE | Facility: CLINIC | Age: 27
End: 2022-02-04
Payer: COMMERCIAL

## 2022-02-04 VITALS
HEART RATE: 76 BPM | HEIGHT: 66 IN | TEMPERATURE: 98 F | BODY MASS INDEX: 29.94 KG/M2 | WEIGHT: 186.31 LBS | OXYGEN SATURATION: 97 % | RESPIRATION RATE: 17 BRPM | DIASTOLIC BLOOD PRESSURE: 76 MMHG | SYSTOLIC BLOOD PRESSURE: 104 MMHG

## 2022-02-04 DIAGNOSIS — D22.9 ENLARGED SKIN MOLE: ICD-10-CM

## 2022-02-04 DIAGNOSIS — E55.9 VITAMIN D INSUFFICIENCY: ICD-10-CM

## 2022-02-04 DIAGNOSIS — M79.674 TOE PAIN, RIGHT: Primary | ICD-10-CM

## 2022-02-04 PROCEDURE — 99213 PR OFFICE/OUTPT VISIT, EST, LEVL III, 20-29 MIN: ICD-10-PCS | Mod: 25,S$GLB,, | Performed by: HOSPITALIST

## 2022-02-04 PROCEDURE — 90686 IIV4 VACC NO PRSV 0.5 ML IM: CPT | Mod: S$GLB,,, | Performed by: HOSPITALIST

## 2022-02-04 PROCEDURE — 90471 IMMUNIZATION ADMIN: CPT | Mod: S$GLB,,, | Performed by: HOSPITALIST

## 2022-02-04 PROCEDURE — 99213 OFFICE O/P EST LOW 20 MIN: CPT | Mod: 25,S$GLB,, | Performed by: HOSPITALIST

## 2022-02-04 PROCEDURE — 99999 PR PBB SHADOW E&M-EST. PATIENT-LVL IV: CPT | Mod: PBBFAC,,, | Performed by: HOSPITALIST

## 2022-02-04 PROCEDURE — 99999 PR PBB SHADOW E&M-EST. PATIENT-LVL IV: ICD-10-PCS | Mod: PBBFAC,,, | Performed by: HOSPITALIST

## 2022-02-04 PROCEDURE — 90471 FLU VACCINE (QUAD) GREATER THAN OR EQUAL TO 3YO PRESERVATIVE FREE IM: ICD-10-PCS | Mod: S$GLB,,, | Performed by: HOSPITALIST

## 2022-02-04 PROCEDURE — 90686 FLU VACCINE (QUAD) GREATER THAN OR EQUAL TO 3YO PRESERVATIVE FREE IM: ICD-10-PCS | Mod: S$GLB,,, | Performed by: HOSPITALIST

## 2022-02-04 RX ORDER — ERGOCALCIFEROL 1.25 MG/1
50000 CAPSULE ORAL
Qty: 12 CAPSULE | Refills: 0 | Status: SHIPPED | OUTPATIENT
Start: 2022-02-04 | End: 2022-05-04

## 2022-02-04 NOTE — PROGRESS NOTES
Chief Complaint  Chief Complaint   Patient presents with    Toe Pain    Annual Exam     Spot on shoulder       HPI  Maria Ines Lea is a 26 y.o. male with a history of asthma, ADHD, and vitamin D deficiency that presents for evaluation of R great toe pain and a R shoulder skin lesion. Patient reports about 2 months ago he went paint balling on a course that had rough terrain. While doing this activity patient began to have R great toe pain. Patient states he had daily R great toe pain for about one month, worse when bending the toe. Patient had a short time right after the injury when he felt it was hot, but denies swelling or redness of the toe or joint. The patient reports for the past one month the pain has decreased in severity and frequency, though will occasionally still hurt if he hyper-extends or hyper-flexes the toe. Patient has no difficulty bearing weight on the toe or ambulating for long periods of time at work. No numbness or tingling in the toe. Patient has not needed to take ibuprofen or Tylenol for his discomfort.     Patient also reports a mole on his R anterior shoulder that he has had for several years. Patient states this mole will intermittently bother him especially if he notices it and starts to rub it for several minutes. Patient thinks the mole has slowly increased in size over several years. Patient denies color change or change in shape. No pain or tenderness of the mole.    Patient is requesting a refill of his Vitamin D medication as well.     PAST MEDICAL HISTORY:  Past Medical History:   Diagnosis Date    ADHD (attention deficit hyperactivity disorder)     diagnosed 4th gradem treated with Vyvanse at one time but patient has difficulty swallowing pills    ALLERGIC RHINITIS     Asthma     Asthma, well controlled 7/20/2012    Bronchitis     Eczema     Hemoglobin C trait        PAST SURGICAL HISTORY:  Past Surgical History:   Procedure Laterality Date    none         SOCIAL  HISTORY:  Social History     Socioeconomic History    Marital status: Single   Tobacco Use    Smoking status: Never Smoker    Smokeless tobacco: Never Used   Substance and Sexual Activity    Alcohol use: Yes    Drug use: No    Sexual activity: Not Currently     Partners: Female     Birth control/protection: Condom     Comment: partner receives depo   Social History Narrative    Lives with parents and sister when she is home from college. Attends [employer] -     One dog.        FAMILY HISTORY:  Family History   Problem Relation Age of Onset    Allergic rhinitis Mother     Asthma Mother     Allergies Mother     Migraines Mother     Glucose-6-phos deficiency Father     Eczema Father     Thyroid disease Maternal Grandmother     Breast cancer Maternal Grandmother     Diabetes Maternal Grandmother     Asthma Sister     Allergies Maternal Aunt     Asthma Maternal Aunt     Hypertension Maternal Aunt     Migraines Maternal Aunt     Other Maternal Uncle     Diabetes Paternal Grandmother     Hypertension Paternal Grandmother     Heart disease Paternal Grandmother     Glaucoma Paternal Grandmother     Early death Neg Hx        ALLERGIES AND MEDICATIONS: updated and reviewed.  Review of patient's allergies indicates:  No Known Allergies  Current Outpatient Medications   Medication Sig Dispense Refill    albuterol (PROAIR HFA) 90 mcg/actuation HFAA Inhale 2 puffs into the lungs every 4 (four) hours as needed. 1 - 2 HFA Aerosol Inhaler Inhalation Every 4-6 hours 3 Inhaler 2    fluticasone (FLONASE) 50 mcg/actuation nasal spray 1 spray (50 mcg total) by Each Nare route 2 (two) times daily. 1 Bottle 3    inhalation device (AEROCHAMBER PLUS FLOW-VU) Use as directed for inhalation. 2 Device 0    ergocalciferol (ERGOCALCIFEROL) 50,000 unit Cap Take 1 capsule (50,000 Units total) by mouth every 7 days. 12 capsule 0     No current facility-administered medications for this visit.         ROS  Review of Systems  "  Constitutional: Negative for chills, fatigue, fever and unexpected weight change.   HENT: Negative for rhinorrhea, sinus pressure, sinus pain and sore throat.    Eyes: Negative for visual disturbance.   Respiratory: Negative for cough and shortness of breath.    Cardiovascular: Negative for chest pain, palpitations and leg swelling.   Gastrointestinal: Negative for abdominal pain, constipation, diarrhea, nausea and vomiting.   Genitourinary: Negative for difficulty urinating, dysuria and frequency.   Musculoskeletal: Negative for arthralgias, joint swelling and myalgias.        Positive for R great toe pain, intermittent, w/active ROM   Skin: Negative for rash.        Positive for R shoulder mole   Neurological: Negative for dizziness, speech difficulty, weakness, light-headedness and headaches.   Psychiatric/Behavioral: Negative for dysphoric mood, sleep disturbance and suicidal ideas. The patient is not nervous/anxious.    All other systems reviewed and are negative.      Physical Exam  Vitals:    02/04/22 1111   BP: 104/76   BP Location: Left arm   Patient Position: Sitting   BP Method: Large (Manual)   Pulse: 76   Resp: 17   Temp: 97.7 °F (36.5 °C)   TempSrc: Temporal   SpO2: 97%   Weight: 84.5 kg (186 lb 4.6 oz)   Height: 5' 6" (1.676 m)    Body mass index is 30.07 kg/m².  Weight: 84.5 kg (186 lb 4.6 oz)   Height: 5' 6" (167.6 cm)   Physical Exam  Vitals and nursing note reviewed.   Constitutional:       Appearance: Normal appearance.   HENT:      Head: Normocephalic and atraumatic.   Eyes:      Extraocular Movements: Extraocular movements intact.      Pupils: Pupils are equal, round, and reactive to light.   Musculoskeletal:      Right foot: Normal range of motion and normal capillary refill. No swelling, laceration, tenderness, bony tenderness or crepitus. Normal pulse.      Left foot: Normal.   Skin:     General: Skin is warm and dry.      Coloration: Skin is not pale.      Findings: Lesion (R anterior " shoulder, well-circumscribed, non-tender nevus) present. No erythema or rash.   Neurological:      General: No focal deficit present.      Mental Status: He is alert and oriented to person, place, and time.   Psychiatric:         Mood and Affect: Mood normal.         Behavior: Behavior normal.           Health Maintenance       Date Due Completion Date    Pneumococcal Vaccines (Age 0-64) (1 of 4 - PCV13) 07/18/2003 3/23/2001    Influenza Vaccine (1) 09/01/2021 10/8/2020    TETANUS VACCINE 04/01/2029 4/1/2019            Assessment and Plan:  Mr Maria Ines Lea is a 26 year old man with a history of asthma, ADHD, and Vitamin D deficiency who presented with R great toe pain and a R shoulder skin lesion. Patient sustained an injury to his R great toe playing paint ball approximately two months ago. Patient now with intermittent R great toe pain when hyperextending or hyperflexing the toe. Examination of the R great toe was unremarkable for bony tenderness, joint swelling, or pain with active or passive ROM. No evidence of deformity or effusion. Differential diagnosis for this injury is most likely for R great toe sprain, but could also include fracture though this is less likely. No indication for XR at this time as patient's pain is improving and not impacting his daily activities. Recommended NSAIDs and Tylenol as needed for pain as well as to wear support shoes. Follow up if pain worsens or persists.     Examination of the R shoulder skin lesion consistent with a nevus. No concern for melanoma as there has been no color change, borders of the lesion are regular and symmetric, and the patient has not noticed a significant change in the lesion. Will refer to Dermatology for further evaluation and elective removal.     Patient has a history of low vitamin D. Vitamin D testing not available at this time due to limited reagent. Will re-prescribe once weekly vitamin D tablets with PCP follow up in three months for  reevaluation.     Patient is due for annual influenza vaccination and agreed to receive this today.      Toe pain, right    Enlarged skin mole  -     Ambulatory referral/consult to Dermatology; Future; Expected date: 02/11/2022    Vitamin D insufficiency    Other orders  -     ergocalciferol (ERGOCALCIFEROL) 50,000 unit Cap; Take 1 capsule (50,000 Units total) by mouth every 7 days.  Dispense: 12 capsule; Refill: 0  -     Influenza - Quadrivalent (PF)      Signed By: MOHAMUD Becker

## 2022-02-04 NOTE — PROGRESS NOTES
Subjective:     @Patient ID: Maria Ines Lea is a 26 y.o. male.    Chief Complaint: Toe Pain    HPI    27 yo M with asthma, hemoglobin c trait presents with c/o    1. Toe pain: reports pain of R great toe started 2-3 months ago after paint balling. Near MTP joint. Initially daily but improved. Only with a lot pressure when standing or hyperextending or bending the toe.     2. Skin mole: reports mole has been present for awhile but is enlarging. Would like to have it removed:     3. Vit d: would like refill of medication     Review of Systems   Constitutional: Negative for chills, fatigue and fever.   HENT: Negative for congestion and sore throat.    Eyes: Negative for pain and visual disturbance.   Respiratory: Negative for cough and shortness of breath.    Cardiovascular: Negative for chest pain and leg swelling.   Gastrointestinal: Negative for abdominal pain, nausea and vomiting.   Endocrine: Negative for polydipsia and polyuria.   Genitourinary: Negative for difficulty urinating and dysuria.   Musculoskeletal: Positive for arthralgias. Negative for back pain.   Skin: Negative for rash and wound.   Neurological: Negative for weakness and headaches.   Psychiatric/Behavioral: Negative for agitation and confusion.     Past medical history, surgical history, and family medical history reviewed and updated as appropriate.    Medications and allergies reviewed.     Objective:     There were no vitals filed for this visit.  There is no height or weight on file to calculate BMI.  Physical Exam  Constitutional:       Appearance: Normal appearance.   HENT:      Head: Normocephalic and atraumatic.   Eyes:      General:         Right eye: No discharge.         Left eye: No discharge.      Conjunctiva/sclera: Conjunctivae normal.   Cardiovascular:      Rate and Rhythm: Normal rate and regular rhythm.      Pulses:           Dorsalis pedis pulses are 2+ on the right side.      Heart sounds: No murmur heard.      Pulmonary:       Effort: Pulmonary effort is normal.      Breath sounds: Normal breath sounds.   Musculoskeletal:         General: No swelling, tenderness (no TTP of right great toe ) or deformity. Normal range of motion.      Cervical back: Normal range of motion and neck supple.      Right lower leg: No edema.      Left lower leg: No edema.   Skin:     General: Skin is warm and dry.      Comments: + prominent round mole of anterior shoulder    Neurological:      Mental Status: He is alert and oriented to person, place, and time.   Psychiatric:         Mood and Affect: Mood normal.         Behavior: Behavior normal.         Lab Results   Component Value Date    WBC 6.11 09/10/2020    HGB 13.7 (L) 09/10/2020    HCT 40.7 09/10/2020     09/10/2020    CHOL 127 12/29/2016    TRIG 51 12/29/2016    HDL 44 12/29/2016    ALT 14 09/10/2020    AST 16 09/10/2020     09/10/2020    K 3.5 09/10/2020     09/10/2020    CREATININE 1.0 09/10/2020    BUN 11 09/10/2020    CO2 27 09/10/2020    TSH 0.724 09/10/2020       Assessment:     1. Toe pain, right    2. Enlarged skin mole    3. Vitamin D insufficiency      Plan:   Maria Ines was seen today for toe pain.    Diagnoses and all orders for this visit:    Toe pain, right  - currently improved. Will monitor. If worsens may need imaging and podiatry referral.     Enlarged skin mole  -     Ambulatory referral/consult to Dermatology; Future    Vitamin D insufficiency    Other orders  -     ergocalciferol (ERGOCALCIFEROL) 50,000 unit Cap; Take 1 capsule (50,000 Units total) by mouth every 7 days.  -     Influenza - Quadrivalent (PF)        No follow-ups on file.    Sole Conde MD  Internal Medicine    2/4/2022

## 2022-03-29 ENCOUNTER — PATIENT OUTREACH (OUTPATIENT)
Dept: ADMINISTRATIVE | Facility: OTHER | Age: 27
End: 2022-03-29
Payer: COMMERCIAL

## 2022-03-30 ENCOUNTER — OFFICE VISIT (OUTPATIENT)
Dept: DERMATOLOGY | Facility: CLINIC | Age: 27
End: 2022-03-30
Payer: COMMERCIAL

## 2022-03-30 VITALS — WEIGHT: 186 LBS | BODY MASS INDEX: 30.02 KG/M2

## 2022-03-30 DIAGNOSIS — D23.9 DERMATOFIBROMA: Primary | ICD-10-CM

## 2022-03-30 DIAGNOSIS — D22.9 ENLARGED SKIN MOLE: ICD-10-CM

## 2022-03-30 PROCEDURE — 99999 PR PBB SHADOW E&M-EST. PATIENT-LVL III: CPT | Mod: PBBFAC,,, | Performed by: DERMATOLOGY

## 2022-03-30 PROCEDURE — 99999 PR PBB SHADOW E&M-EST. PATIENT-LVL III: ICD-10-PCS | Mod: PBBFAC,,, | Performed by: DERMATOLOGY

## 2022-03-30 PROCEDURE — 99202 PR OFFICE/OUTPT VISIT, NEW, LEVL II, 15-29 MIN: ICD-10-PCS | Mod: S$GLB,,, | Performed by: DERMATOLOGY

## 2022-03-30 PROCEDURE — 99202 OFFICE O/P NEW SF 15 MIN: CPT | Mod: S$GLB,,, | Performed by: DERMATOLOGY

## 2022-03-30 NOTE — PROGRESS NOTES
Subjective:       Patient ID:  Maria Ines Lea is a 26 y.o. male who presents for   Chief Complaint   Patient presents with    Spot     shoulder     Lesion on right shoulder for over a year does not think it has changed not painful.  Feels it sometimes with hand and thinking may want it removed.       Review of Systems   Constitutional: Negative for fever, chills, weight loss, weight gain, fatigue, night sweats and malaise.   Skin: Positive for wears hat. Negative for daily sunscreen use and activity-related sunscreen use.   Hematologic/Lymphatic: Does not bruise/bleed easily.        Objective:    Physical Exam   Constitutional: He appears well-developed and well-nourished.   Neurological: He is alert and oriented to person, place, and time.   Psychiatric: He has a normal mood and affect.   Skin:   Areas Examined (abnormalities noted in diagram):   Chest / Axilla Inspection Performed              Diagram Legend     Erythematous scaling macule/papule c/w actinic keratosis       Vascular papule c/w angioma      Pigmented verrucoid papule/plaque c/w seborrheic keratosis      Yellow umbilicated papule c/w sebaceous hyperplasia      Irregularly shaped tan macule c/w lentigo     1-2 mm smooth white papules consistent with Milia      Movable subcutaneous cyst with punctum c/w epidermal inclusion cyst      Subcutaneous movable cyst c/w pilar cyst      Firm pink to brown papule c/w dermatofibroma      Pedunculated fleshy papule(s) c/w skin tag(s)      Evenly pigmented macule c/w junctional nevus     Mildly variegated pigmented, slightly irregular-bordered macule c/w mildly atypical nevus      Flesh colored to evenly pigmented papule c/w intradermal nevus       Pink pearly papule/plaque c/w basal cell carcinoma      Erythematous hyperkeratotic cursted plaque c/w SCC      Surgical scar with no sign of skin cancer recurrence      Open and closed comedones      Inflammatory papules and pustules      Verrucoid papule  consistent consistent with wart     Erythematous eczematous patches and plaques     Dystrophic onycholytic nail with subungual debris c/w onychomycosis     Umbilicated papule    Erythematous-base heme-crusted tan verrucoid plaque consistent with inflamed seborrheic keratosis     Erythematous Silvery Scaling Plaque c/w Psoriasis     See annotation      Assessment / Plan:        Dermatofibroma  Reassurance benign  Explained would require excision to remove it and would create a scar  He will call if grows, desires tx.     Enlarged skin mole  -     Ambulatory referral/consult to Dermatology             Follow up if symptoms worsen or fail to improve.

## 2022-04-08 ENCOUNTER — HOSPITAL ENCOUNTER (OUTPATIENT)
Dept: RADIOLOGY | Facility: HOSPITAL | Age: 27
Discharge: HOME | End: 2022-04-08
Attending: NURSE PRACTITIONER
Payer: COMMERCIAL

## 2022-04-08 ENCOUNTER — OFFICE VISIT (OUTPATIENT)
Dept: INTERNAL MEDICINE | Facility: CLINIC | Age: 27
End: 2022-04-08
Payer: COMMERCIAL

## 2022-04-08 VITALS
BODY MASS INDEX: 30.9 KG/M2 | SYSTOLIC BLOOD PRESSURE: 128 MMHG | HEIGHT: 66 IN | DIASTOLIC BLOOD PRESSURE: 78 MMHG | HEART RATE: 80 BPM | TEMPERATURE: 98 F | WEIGHT: 192.25 LBS | RESPIRATION RATE: 16 BRPM | OXYGEN SATURATION: 96 %

## 2022-04-08 DIAGNOSIS — M54.50 ACUTE MIDLINE LOW BACK PAIN WITHOUT SCIATICA: ICD-10-CM

## 2022-04-08 DIAGNOSIS — M54.6 ACUTE MIDLINE THORACIC BACK PAIN: ICD-10-CM

## 2022-04-08 DIAGNOSIS — T14.8XXA MUSCLE STRAIN: Primary | ICD-10-CM

## 2022-04-08 PROCEDURE — 72110 XR LUMBAR SPINE COMPLETE 5 VIEW: ICD-10-PCS | Mod: 26,,, | Performed by: RADIOLOGY

## 2022-04-08 PROCEDURE — 99214 OFFICE O/P EST MOD 30 MIN: CPT | Mod: 25,S$GLB,, | Performed by: NURSE PRACTITIONER

## 2022-04-08 PROCEDURE — 72110 X-RAY EXAM L-2 SPINE 4/>VWS: CPT | Mod: 26,,, | Performed by: RADIOLOGY

## 2022-04-08 PROCEDURE — 72110 X-RAY EXAM L-2 SPINE 4/>VWS: CPT | Mod: TC

## 2022-04-08 PROCEDURE — 99999 PR PBB SHADOW E&M-EST. PATIENT-LVL IV: ICD-10-PCS | Mod: PBBFAC,,, | Performed by: NURSE PRACTITIONER

## 2022-04-08 PROCEDURE — 72070 XR THORACIC SPINE AP LATERAL: ICD-10-PCS | Mod: 26,,, | Performed by: RADIOLOGY

## 2022-04-08 PROCEDURE — 99214 PR OFFICE/OUTPT VISIT, EST, LEVL IV, 30-39 MIN: ICD-10-PCS | Mod: 25,S$GLB,, | Performed by: NURSE PRACTITIONER

## 2022-04-08 PROCEDURE — 99999 PR PBB SHADOW E&M-EST. PATIENT-LVL IV: CPT | Mod: PBBFAC,,, | Performed by: NURSE PRACTITIONER

## 2022-04-08 PROCEDURE — 96372 PR INJECTION,THERAP/PROPH/DIAG2ST, IM OR SUBCUT: ICD-10-PCS | Mod: S$GLB,,, | Performed by: NURSE PRACTITIONER

## 2022-04-08 PROCEDURE — 72070 X-RAY EXAM THORAC SPINE 2VWS: CPT | Mod: 26,,, | Performed by: RADIOLOGY

## 2022-04-08 PROCEDURE — 96372 THER/PROPH/DIAG INJ SC/IM: CPT | Mod: S$GLB,,, | Performed by: NURSE PRACTITIONER

## 2022-04-08 PROCEDURE — 72070 X-RAY EXAM THORAC SPINE 2VWS: CPT | Mod: TC

## 2022-04-08 RX ORDER — TRIAMCINOLONE ACETONIDE 40 MG/ML
40 INJECTION, SUSPENSION INTRA-ARTICULAR; INTRAMUSCULAR ONCE
Status: COMPLETED | OUTPATIENT
Start: 2022-04-08 | End: 2022-04-08

## 2022-04-08 RX ADMIN — TRIAMCINOLONE ACETONIDE 40 MG: 40 INJECTION, SUSPENSION INTRA-ARTICULAR; INTRAMUSCULAR at 02:04

## 2022-04-08 NOTE — PROGRESS NOTES
[facility]    Chief Complaint      Chief Complaint   Patient presents with    Back Pain     Sharp pain upper middle      History of Present Illness      Maria Ines Lea is a 26 y.o. male patient of Dr. Soto who is new to me and presents today for c/o sharp mid back pain that started a few days ago, hard to stand up straight, feeling tension, muscle spasms. No known trauma, has been taking tylenol, epsom salt w/o muc relief.   Pt doesn't want any meds, has hard time swallowing pills or capsules.       Health Maintenance   Topic Date Due    TETANUS VACCINE  04/01/2029    Hepatitis C Screening  Completed    Lipid Panel  Completed    HPV Vaccines  Completed       Past Medical History:   Diagnosis Date    ADHD (attention deficit hyperactivity disorder)     diagnosed 4th gradem treated with Vyvanse at one time but patient has difficulty swallowing pills    ALLERGIC RHINITIS     Asthma     Asthma, well controlled 7/20/2012    Bronchitis     Eczema     Hemoglobin C trait        Past Surgical History:   Procedure Laterality Date    none         family history includes Allergic rhinitis in his mother; Allergies in his maternal aunt and mother; Asthma in his maternal aunt, mother, and sister; Breast cancer in his maternal grandmother; Diabetes in his maternal grandmother and paternal grandmother; Eczema in his father; Glaucoma in his paternal grandmother; Glucose-6-phos deficiency in his father; Heart disease in his paternal grandmother; Hypertension in his maternal aunt and paternal grandmother; Migraines in his maternal aunt and mother; Other in his maternal uncle; Thyroid disease in his maternal grandmother.     Social History     Tobacco Use    Smoking status: Never Smoker    Smokeless tobacco: Never Used   Substance Use Topics    Alcohol use: Yes    Drug use: No       Review of Systems   Constitutional: Negative for fever and malaise/fatigue.   Respiratory: Negative for  "shortness of breath.    Cardiovascular: Negative for chest pain.   Gastrointestinal: Negative for nausea.   Genitourinary: Negative for dysuria.   Musculoskeletal: Positive for back pain. Negative for falls, joint pain and neck pain.   Neurological: Negative for dizziness, weakness and headaches.        Outpatient Encounter Medications as of 2022   Medication Sig Dispense Refill    albuterol (PROAIR HFA) 90 mcg/actuation HFAA Inhale 2 puffs into the lungs every 4 (four) hours as needed. 1 - 2 HFA Aerosol Inhaler Inhalation Every 4-6 hours 3 Inhaler 2    ergocalciferol (ERGOCALCIFEROL) 50,000 unit Cap Take 1 capsule (50,000 Units total) by mouth every 7 days. 12 capsule 0    fluticasone (FLONASE) 50 mcg/actuation nasal spray 1 spray (50 mcg total) by Each Nare route 2 (two) times daily. 1 Bottle 3    inhalation device (AEROCHAMBER PLUS FLOW-VU) Use as directed for inhalation. 2 Device 0    [] triamcinolone acetonide injection 40 mg        No facility-administered encounter medications on file as of 2022.       Review of patient's allergies indicates:  No Known Allergies    Physical Exam      Vital Signs  Temp: 98 °F (36.7 °C)  Temp src: Oral  Pulse: 80  Resp: 16  SpO2: 96 %  BP: 128/78  BP Location: Left arm  Patient Position: Sitting  Pain Score:   4  Height and Weight  Height: 5' 6" (167.6 cm)  Weight: 87.2 kg (192 lb 3.9 oz)  BSA (Calculated - sq m): 2.01 sq meters  BMI (Calculated): 31  Weight in (lb) to have BMI = 25: 154.6    Physical Exam  Vitals and nursing note reviewed.   Constitutional:       General: He is not in acute distress.     Appearance: Normal appearance. He is ill-appearing.   HENT:      Head: Normocephalic and atraumatic.   Cardiovascular:      Rate and Rhythm: Normal rate and regular rhythm.      Heart sounds: Normal heart sounds.   Pulmonary:      Effort: Pulmonary effort is normal. No respiratory distress.   Musculoskeletal:         General: Tenderness present. No " swelling, deformity or signs of injury.   Skin:     General: Skin is warm and dry.      Findings: No bruising or erythema.   Neurological:      Mental Status: He is alert and oriented to person, place, and time.   Psychiatric:         Mood and Affect: Mood normal.         Behavior: Behavior normal.         Thought Content: Thought content normal.         Judgment: Judgment normal.          Laboratory:  CBC:  Lab Results   Component Value Date    WBC 6.11 09/10/2020    RBC 4.74 09/10/2020    HGB 13.7 (L) 09/10/2020    HCT 40.7 09/10/2020     09/10/2020    MCV 86 09/10/2020    MCH 28.9 09/10/2020    MCHC 33.7 09/10/2020    MCHC 34.3 07/01/2019    MCHC 34.1 03/25/2019     CMP:  Lab Results   Component Value Date    GLU 87 09/10/2020    CALCIUM 9.3 09/10/2020    ALBUMIN 4.4 09/10/2020    PROT 7.6 09/10/2020     09/10/2020    K 3.5 09/10/2020    CO2 27 09/10/2020     09/10/2020    BUN 11 09/10/2020    ALKPHOS 57 09/10/2020    ALT 14 09/10/2020    AST 16 09/10/2020    BILITOT 1.8 (H) 09/10/2020    BILITOT 1.9 (H) 03/25/2019    BILITOT 1.8 (H) 12/29/2016     URINALYSIS:  Lab Results   Component Value Date    COLORU Yellow 09/10/2020    SPECGRAV >=1.030 (A) 09/10/2020    PHUR 6.0 09/10/2020    PROTEINUA Negative 09/10/2020    NITRITE Negative 09/10/2020    LEUKOCYTESUR Negative 09/10/2020    UROBILINOGEN Negative 09/10/2020      LIPIDS:  Lab Results   Component Value Date    TSH 0.724 09/10/2020    TSH 1.525 03/25/2019    TSH 0.486 01/30/2018    HDL 44 12/29/2016    HDL 60 08/01/2009    CHOL 127 12/29/2016    CHOL 123 08/29/2013    CHOL 167 08/01/2009    TRIG 51 12/29/2016    TRIG 71 08/01/2009    LDLCALC 72.8 12/29/2016    LDLCALC 92.8 08/01/2009    CHOLHDL 34.6 12/29/2016    CHOLHDL 35.9 08/01/2009    NONHDLCHOL 83 12/29/2016    TOTALCHOLEST 2.9 12/29/2016    TOTALCHOLEST 2.8 08/01/2009     TSH:  Lab Results   Component Value Date    TSH 0.724 09/10/2020    TSH 1.525 03/25/2019    TSH 0.486 01/30/2018      A1C:  No results found for: HGBA1C      Assessment/Plan     Maria Ines Lea is a 26 y.o.male with:    Muscle strain  -     triamcinolone acetonide injection 40 mg    Acute midline thoracic back pain  -     X-Ray Lumbar Spine 5 View; Future; Expected date: 04/08/2022  -     X-Ray Thoracic Spine AP Lateral; Future; Expected date: 04/08/2022    Acute midline low back pain without sciatica  -     X-Ray Lumbar Spine 5 View; Future; Expected date: 04/08/2022  -     X-Ray Thoracic Spine AP Lateral; Future; Expected date: 04/08/2022     stay hydrated with water  Light stretches,  Warm compresses as needed    I spent 30 minutes on the day of this encounter for preparing for, evaluating, treating, and managing this patient.        -Continue current medications and maintain follow up with specialists.  Return to clinic as needed for any concerns   No follow-ups on file.      ZELALEM MaC  [facility] [place]

## 2023-02-01 ENCOUNTER — OFFICE VISIT (OUTPATIENT)
Dept: PRIMARY CARE CLINIC | Facility: CLINIC | Age: 28
End: 2023-02-01
Payer: COMMERCIAL

## 2023-02-01 ENCOUNTER — TELEPHONE (OUTPATIENT)
Dept: PRIMARY CARE CLINIC | Facility: CLINIC | Age: 28
End: 2023-02-01

## 2023-02-01 DIAGNOSIS — R09.81 SINUS CONGESTION: Primary | ICD-10-CM

## 2023-02-01 DIAGNOSIS — J30.2 SEASONAL ALLERGIES: ICD-10-CM

## 2023-02-01 PROCEDURE — 1160F PR REVIEW ALL MEDS BY PRESCRIBER/CLIN PHARMACIST DOCUMENTED: ICD-10-PCS | Mod: CPTII,95,, | Performed by: NURSE PRACTITIONER

## 2023-02-01 PROCEDURE — 99214 PR OFFICE/OUTPT VISIT, EST, LEVL IV, 30-39 MIN: ICD-10-PCS | Mod: 95,,, | Performed by: NURSE PRACTITIONER

## 2023-02-01 PROCEDURE — 1159F MED LIST DOCD IN RCRD: CPT | Mod: CPTII,95,, | Performed by: NURSE PRACTITIONER

## 2023-02-01 PROCEDURE — 1160F RVW MEDS BY RX/DR IN RCRD: CPT | Mod: CPTII,95,, | Performed by: NURSE PRACTITIONER

## 2023-02-01 PROCEDURE — 1159F PR MEDICATION LIST DOCUMENTED IN MEDICAL RECORD: ICD-10-PCS | Mod: CPTII,95,, | Performed by: NURSE PRACTITIONER

## 2023-02-01 PROCEDURE — 99214 OFFICE O/P EST MOD 30 MIN: CPT | Mod: 95,,, | Performed by: NURSE PRACTITIONER

## 2023-02-01 RX ORDER — CETIRIZINE HYDROCHLORIDE 10 MG/1
10 TABLET ORAL DAILY
Qty: 30 TABLET | Refills: 11 | Status: SHIPPED | OUTPATIENT
Start: 2023-02-01 | End: 2024-02-01

## 2023-02-01 RX ORDER — PREDNISONE 20 MG/1
20 TABLET ORAL DAILY
Qty: 5 TABLET | Refills: 0 | Status: SHIPPED | OUTPATIENT
Start: 2023-02-01 | End: 2023-02-02

## 2023-02-01 NOTE — PROGRESS NOTES
[facility] Note    Chief Complaint      Chief Complaint   Patient presents with    Sinus Problem       History of Present Illness      Maria Ines Lea is a 27 y.o. male who presents today via virtual visit for   Chief Complaint   Patient presents with    Sinus Problem         Mr. Lea is a very pleasant 26 y/o male who presents via virtual visit to discuss recent sinus congestion which started yesterday. He has tried treating with mucinex with no relief of symptoms. He denies any SOB, chest pain, N/V, unintentional weight loss, loss of appetite, fatigue, diarrhea, constipation. He is active daily and remains independent with ADL's.     Patient did a home covid test during virtual visit. Results are negative.    Review of Systems   Constitutional:  Positive for chills.   HENT:  Positive for congestion and sinus pain.    Eyes: Negative.    Respiratory: Negative.     Cardiovascular: Negative.    Gastrointestinal: Negative.    Genitourinary: Negative.    Musculoskeletal: Negative.    Skin: Negative.    Neurological: Negative.    Endo/Heme/Allergies:  Positive for environmental allergies.   Psychiatric/Behavioral: Negative.        Family History:  family history includes Allergic rhinitis in his mother; Allergies in his maternal aunt and mother; Asthma in his maternal aunt, mother, and sister; Breast cancer in his maternal grandmother; Diabetes in his maternal grandmother and paternal grandmother; Eczema in his father; Glaucoma in his paternal grandmother; Glucose-6-phos deficiency in his father; Heart disease in his paternal grandmother; Hypertension in his maternal aunt and paternal grandmother; Migraines in his maternal aunt and mother; Other in his maternal uncle; Thyroid disease in his maternal grandmother.   Family history was reviewed with patient.     Medications:  Outpatient Encounter Medications as of 2/1/2023   Medication Sig Dispense Refill    albuterol (PROAIR HFA) 90 mcg/actuation HFAA  Inhale 2 puffs into the lungs every 4 (four) hours as needed. 1 - 2 HFA Aerosol Inhaler Inhalation Every 4-6 hours 3 Inhaler 2    cetirizine (ZYRTEC) 10 MG tablet Take 1 tablet (10 mg total) by mouth once daily. 30 tablet 11    ergocalciferol (ERGOCALCIFEROL) 50,000 unit Cap TAKE 1 CAPSULE BY MOUTH ONE TIME PER WEEK 12 capsule 0    fluticasone (FLONASE) 50 mcg/actuation nasal spray 1 spray (50 mcg total) by Each Nare route 2 (two) times daily. 1 Bottle 3    inhalation device (AEROCHAMBER PLUS FLOW-VU) Use as directed for inhalation. 2 Device 0    predniSONE (DELTASONE) 20 MG tablet Take 1 tablet (20 mg total) by mouth once daily. 5 tablet 0     No facility-administered encounter medications on file as of 2/1/2023.       Allergies:  Review of patient's allergies indicates:  No Known Allergies    Health Maintenance:  Health Maintenance   Topic Date Due    TETANUS VACCINE  04/01/2029    Hepatitis C Screening  Completed    Lipid Panel  Completed    HPV Vaccines  Completed     Health Maintenance Topics with due status: Not Due       Topic Last Completion Date    TETANUS VACCINE 04/01/2019       Physical Exam     Assessment/Plan     Maria Ines Lea is a 27 y.o.male with:    Sinus congestion  -     predniSONE (DELTASONE) 20 MG tablet; Take 1 tablet (20 mg total) by mouth once daily.  Dispense: 5 tablet; Refill: 0    Seasonal allergies  -     cetirizine (ZYRTEC) 10 MG tablet; Take 1 tablet (10 mg total) by mouth once daily.  Dispense: 30 tablet; Refill: 11        As above, continue current medications and maintain follow up with specialists.  Return to clinic as needed.    I spent 25 minutes on the day of this virtual encounter for preparing, evaluating, treating, and discussing plan of care with this patient.  Greater than 50% of this time was spent face to face via virtual visit with patient.  All questions were answered to patient's satisfaction.        Karen L Spencer, NP-C [facility]                Answers submitted by the patient for this visit:  Review of Systems Questionnaire (Submitted on 2/1/2023)  activity change: No  unexpected weight change: No  neck pain: Yes  hearing loss: No  rhinorrhea: No  trouble swallowing: No  eye discharge: No  visual disturbance: No  chest tightness: Yes  wheezing: Yes  chest pain: Yes  palpitations: No  blood in stool: No  constipation: No  vomiting: No  diarrhea: No  polydipsia: No  polyuria: No  difficulty urinating: No  urgency: No  hematuria: No  joint swelling: No  arthralgias: Yes  headaches: Yes  weakness: Yes  confusion: No  dysphoric mood: No

## 2023-02-01 NOTE — TELEPHONE ENCOUNTER
----- Message from [place] sent at 2/1/2023  2:54 PM CST -----  Contact: [phone]  Patient called, requested a call back from nurse in regards patient have trouble swallowing pills, will be any effect if crush the pills. Please call and advise. Thank you

## 2023-02-02 ENCOUNTER — OFFICE VISIT (OUTPATIENT)
Dept: INTERNAL MEDICINE | Facility: CLINIC | Age: 28
End: 2023-02-02
Payer: COMMERCIAL

## 2023-02-02 VITALS
HEIGHT: 68 IN | SYSTOLIC BLOOD PRESSURE: 122 MMHG | TEMPERATURE: 98 F | WEIGHT: 199.06 LBS | OXYGEN SATURATION: 97 % | DIASTOLIC BLOOD PRESSURE: 80 MMHG | BODY MASS INDEX: 30.17 KG/M2

## 2023-02-02 DIAGNOSIS — J01.90 ACUTE SINUSITIS, RECURRENCE NOT SPECIFIED, UNSPECIFIED LOCATION: ICD-10-CM

## 2023-02-02 DIAGNOSIS — R09.81 NASAL CONGESTION: Primary | ICD-10-CM

## 2023-02-02 PROCEDURE — 3008F PR BODY MASS INDEX (BMI) DOCUMENTED: ICD-10-PCS | Mod: CPTII,S$GLB,,

## 2023-02-02 PROCEDURE — 3079F PR MOST RECENT DIASTOLIC BLOOD PRESSURE 80-89 MM HG: ICD-10-PCS | Mod: CPTII,S$GLB,,

## 2023-02-02 PROCEDURE — 99999 PR PBB SHADOW E&M-EST. PATIENT-LVL IV: CPT | Mod: PBBFAC,,,

## 2023-02-02 PROCEDURE — 99215 OFFICE O/P EST HI 40 MIN: CPT | Mod: 25,S$GLB,,

## 2023-02-02 PROCEDURE — 3008F BODY MASS INDEX DOCD: CPT | Mod: CPTII,S$GLB,,

## 2023-02-02 PROCEDURE — 3079F DIAST BP 80-89 MM HG: CPT | Mod: CPTII,S$GLB,,

## 2023-02-02 PROCEDURE — 96372 THER/PROPH/DIAG INJ SC/IM: CPT | Mod: S$GLB,,,

## 2023-02-02 PROCEDURE — 99999 PR PBB SHADOW E&M-EST. PATIENT-LVL IV: ICD-10-PCS | Mod: PBBFAC,,,

## 2023-02-02 PROCEDURE — 1159F PR MEDICATION LIST DOCUMENTED IN MEDICAL RECORD: ICD-10-PCS | Mod: CPTII,S$GLB,,

## 2023-02-02 PROCEDURE — 3074F PR MOST RECENT SYSTOLIC BLOOD PRESSURE < 130 MM HG: ICD-10-PCS | Mod: CPTII,S$GLB,,

## 2023-02-02 PROCEDURE — 3074F SYST BP LT 130 MM HG: CPT | Mod: CPTII,S$GLB,,

## 2023-02-02 PROCEDURE — 99215 PR OFFICE/OUTPT VISIT, EST, LEVL V, 40-54 MIN: ICD-10-PCS | Mod: 25,S$GLB,,

## 2023-02-02 PROCEDURE — 96372 PR INJECTION,THERAP/PROPH/DIAG2ST, IM OR SUBCUT: ICD-10-PCS | Mod: S$GLB,,,

## 2023-02-02 PROCEDURE — 1159F MED LIST DOCD IN RCRD: CPT | Mod: CPTII,S$GLB,,

## 2023-02-02 RX ORDER — AMOXICILLIN AND CLAVULANATE POTASSIUM 400; 57 MG/5ML; MG/5ML
800 POWDER, FOR SUSPENSION ORAL EVERY 12 HOURS
Qty: 140 ML | Refills: 0 | Status: SHIPPED | OUTPATIENT
Start: 2023-02-02 | End: 2023-02-09

## 2023-02-02 RX ORDER — TRIAMCINOLONE ACETONIDE 40 MG/ML
40 INJECTION, SUSPENSION INTRA-ARTICULAR; INTRAMUSCULAR
Status: COMPLETED | OUTPATIENT
Start: 2023-02-02 | End: 2023-02-02

## 2023-02-02 RX ORDER — ALBUTEROL SULFATE 90 UG/1
2 AEROSOL, METERED RESPIRATORY (INHALATION) EVERY 4 HOURS PRN
Qty: 18 G | Refills: 1 | Status: SHIPPED | OUTPATIENT
Start: 2023-02-02 | End: 2023-08-25 | Stop reason: SDUPTHER

## 2023-02-02 RX ADMIN — TRIAMCINOLONE ACETONIDE 40 MG: 40 INJECTION, SUSPENSION INTRA-ARTICULAR; INTRAMUSCULAR at 10:02

## 2023-02-02 NOTE — PATIENT INSTRUCTIONS
Continue symptomatic treatment, including mucinex, tylenol, ocean spray and flonase    Discontinue prednisone

## 2023-02-02 NOTE — PROGRESS NOTES
[facility]    Chief Complaint      Chief Complaint   Patient presents with    Nasal Congestion    Headache     History of Present Illness      Maria Ines Lea is a 27 y.o. male patient of Dr. Arguelles  who presents today for nasal congestion x 2 days.  Pt also has c/o sore throat, sinus pain and pressure, fever, chills, ear pain. Pt denies chest pain, SOB, N/V/D/C.  Aggravating factors:  time.  Relieving factors:  mucinex, ocean nasal spray, flonase.  Currrent pain level: 2/10.  Pt was seen virtually yesterday, and was given prednisone; however, pt unable to swallow pill.  He tried crushing the medication, but still unable to swallow medication.  Today's pain level 2/10.      Health Maintenance   Topic Date Due    TETANUS VACCINE  04/01/2029    Hepatitis C Screening  Completed    Lipid Panel  Completed    HPV Vaccines  Completed       Past Medical History:   Diagnosis Date    ADHD (attention deficit hyperactivity disorder)     diagnosed 4th gradem treated with Vyvanse at one time but patient has difficulty swallowing pills    ALLERGIC RHINITIS     Asthma     Asthma, well controlled 7/20/2012    Bronchitis     Eczema     Hemoglobin C trait        Past Surgical History:   Procedure Laterality Date    none         family history includes Allergic rhinitis in his mother; Allergies in his maternal aunt and mother; Asthma in his maternal aunt, mother, and sister; Breast cancer in his maternal grandmother; Diabetes in his maternal grandmother and paternal grandmother; Eczema in his father; Glaucoma in his paternal grandmother; Glucose-6-phos deficiency in his father; Heart disease in his paternal grandmother; Hypertension in his maternal aunt and paternal grandmother; Migraines in his maternal aunt and mother; Other in his maternal uncle; Thyroid disease in his maternal grandmother.     Social History     Tobacco Use    Smoking status: Never    Smokeless tobacco: Never   Substance Use Topics     Alcohol use: Yes    Drug use: No       Review of Systems   Constitutional:  Positive for chills and fever.   HENT:  Positive for congestion, ear pain and sore throat. Negative for nosebleeds.    Respiratory:  Positive for cough and sputum production. Negative for shortness of breath and wheezing.    Cardiovascular:  Negative for chest pain and palpitations.   Gastrointestinal:  Negative for abdominal pain, constipation, diarrhea, nausea and vomiting.   Musculoskeletal:  Negative for myalgias.   Neurological:  Positive for headaches. Negative for dizziness.      Outpatient Encounter Medications as of 2/2/2023   Medication Sig Note Dispense Refill    cetirizine (ZYRTEC) 10 MG tablet Take 1 tablet (10 mg total) by mouth once daily.  30 tablet 11    ergocalciferol (ERGOCALCIFEROL) 50,000 unit Cap TAKE 1 CAPSULE BY MOUTH ONE TIME PER WEEK  12 capsule 0    fluticasone (FLONASE) 50 mcg/actuation nasal spray 1 spray (50 mcg total) by Each Nare route 2 (two) times daily.  1 Bottle 3    inhalation device (AEROCHAMBER PLUS FLOW-VU) Use as directed for inhalation.  2 Device 0    [DISCONTINUED] albuterol (PROAIR HFA) 90 mcg/actuation HFAA Inhale 2 puffs into the lungs every 4 (four) hours as needed. 1 - 2 HFA Aerosol Inhaler Inhalation Every 4-6 hours  3 Inhaler 2    albuterol (PROAIR HFA) 90 mcg/actuation inhaler Inhale 2 puffs into the lungs every 4 (four) hours as needed for Shortness of Breath. 1 - 2 HFA Aerosol Inhaler Inhalation Every 4-6 hours  18 g 1    amoxicillin-clavulanate (AUGMENTIN) 400-57 mg/5 mL SusR Take 10 mLs (800 mg total) by mouth every 12 (twelve) hours. for 7 days  140 mL 0    [DISCONTINUED] predniSONE (DELTASONE) 20 MG tablet Take 1 tablet (20 mg total) by mouth once daily. (Patient not taking: Reported on 2/2/2023) 2/2/2023: pt unable to swallow pills; he attempted to crush medication, but unable to swallow it 5 tablet 0     Facility-Administered Encounter Medications as of 2/2/2023   Medication Dose  "Route Frequency Provider Last Rate Last Admin    [COMPLETED] triamcinolone acetonide injection 40 mg  40 mg Intramuscular 1 time in Clinic/HOD [place], [place]   40 mg at 02/02/23 1033       Review of patient's allergies indicates:  No Known Allergies    Physical Exam      Vital Signs  Temp: 98.2 °F (36.8 °C)  SpO2: 97 %  BP: 122/80  Pain Score:   2  Height and Weight  Height: 5' 8" (172.7 cm)  Weight: 90.3 kg (199 lb 1.2 oz)  BSA (Calculated - sq m): 2.08 sq meters  BMI (Calculated): 30.3  Weight in (lb) to have BMI = 25: 164.1    Physical Exam  Constitutional:       Appearance: He is ill-appearing.   HENT:      Head: Normocephalic and atraumatic.      Right Ear: Ear canal and external ear normal. Tympanic membrane has decreased mobility.      Left Ear: Ear canal and external ear normal. Tympanic membrane has decreased mobility.      Nose: Congestion and rhinorrhea present.      Right Sinus: Maxillary sinus tenderness present.      Left Sinus: Maxillary sinus tenderness present.      Comments: Greenish congestion noted     Mouth/Throat:      Pharynx: Oropharyngeal exudate and posterior oropharyngeal erythema present.   Cardiovascular:      Rate and Rhythm: Regular rhythm. Tachycardia present.      Pulses: Normal pulses.      Heart sounds: Normal heart sounds.   Pulmonary:      Effort: Pulmonary effort is normal.      Breath sounds: Normal breath sounds.   Neurological:      Mental Status: He is alert.        Laboratory:  CBC:  Lab Results   Component Value Date    WBC 6.11 09/10/2020    RBC 4.74 09/10/2020    HGB 13.7 (L) 09/10/2020    HCT 40.7 09/10/2020     09/10/2020    MCV 86 09/10/2020    MCH 28.9 09/10/2020    MCHC 33.7 09/10/2020    MCHC 34.3 07/01/2019    MCHC 34.1 03/25/2019     CMP:  Lab Results   Component Value Date    GLU 87 09/10/2020    CALCIUM 9.3 09/10/2020    ALBUMIN 4.4 09/10/2020    PROT 7.6 09/10/2020     09/10/2020    K 3.5 09/10/2020    CO2 27 09/10/2020     09/10/2020 "    BUN 11 09/10/2020    ALKPHOS 57 09/10/2020    ALT 14 09/10/2020    AST 16 09/10/2020    BILITOT 1.8 (H) 09/10/2020    BILITOT 1.9 (H) 03/25/2019    BILITOT 1.8 (H) 12/29/2016     URINALYSIS:  Lab Results   Component Value Date    COLORU Yellow 09/10/2020    SPECGRAV >=1.030 (A) 09/10/2020    PHUR 6.0 09/10/2020    PROTEINUA Negative 09/10/2020    NITRITE Negative 09/10/2020    LEUKOCYTESUR Negative 09/10/2020    UROBILINOGEN Negative 09/10/2020      LIPIDS:  Lab Results   Component Value Date    TSH 0.724 09/10/2020    TSH 1.525 03/25/2019    TSH 0.486 01/30/2018    HDL 44 12/29/2016    HDL 60 08/01/2009    CHOL 127 12/29/2016    CHOL 123 08/29/2013    CHOL 167 08/01/2009    TRIG 51 12/29/2016    TRIG 71 08/01/2009    LDLCALC 72.8 12/29/2016    LDLCALC 92.8 08/01/2009    CHOLHDL 34.6 12/29/2016    CHOLHDL 35.9 08/01/2009    NONHDLCHOL 83 12/29/2016    TOTALCHOLEST 2.9 12/29/2016    TOTALCHOLEST 2.8 08/01/2009     TSH:  Lab Results   Component Value Date    TSH 0.724 09/10/2020    TSH 1.525 03/25/2019    TSH 0.486 01/30/2018     A1C:  No results found for: HGBA1C      Assessment/Plan     Lobosonny Joe Lea is a 27 y.o.male with:    Nasal congestion    Acute sinusitis, recurrence not specified, unspecified location    Other orders  -     albuterol (PROAIR HFA) 90 mcg/actuation inhaler; Inhale 2 puffs into the lungs every 4 (four) hours as needed for Shortness of Breath. 1 - 2 HFA Aerosol Inhaler Inhalation Every 4-6 hours  Dispense: 18 g; Refill: 1  -     triamcinolone acetonide injection 40 mg  -     amoxicillin-clavulanate (AUGMENTIN) 400-57 mg/5 mL SusR; Take 10 mLs (800 mg total) by mouth every 12 (twelve) hours. for 7 days  Dispense: 140 mL; Refill: 0     Continue symptomatic treatment, including mucinex, tylenol, ocean spray and flonase  Discontinue prednisone    I spent 45 minutes on the day of this encounter for preparing for, evaluating, treating, and managing this patient.        -Continue current  medications and maintain follow up with specialists.  Return to clinic in Follow up in about 1 week (around 2/9/2023), or if symptoms worsen or fail to improve.        Dawn R McCloskey, NP [facility] [place]

## 2023-07-05 ENCOUNTER — PATIENT MESSAGE (OUTPATIENT)
Dept: RESEARCH | Facility: HOSPITAL | Age: 28
End: 2023-07-05
Payer: COMMERCIAL

## 2023-08-25 ENCOUNTER — LAB VISIT (OUTPATIENT)
Dept: LAB | Facility: HOSPITAL | Age: 28
End: 2023-08-25
Attending: INTERNAL MEDICINE
Payer: COMMERCIAL

## 2023-08-25 ENCOUNTER — OFFICE VISIT (OUTPATIENT)
Dept: INTERNAL MEDICINE | Facility: CLINIC | Age: 28
End: 2023-08-25
Payer: COMMERCIAL

## 2023-08-25 VITALS
HEART RATE: 92 BPM | OXYGEN SATURATION: 97 % | HEIGHT: 69 IN | BODY MASS INDEX: 30.14 KG/M2 | WEIGHT: 203.5 LBS | RESPIRATION RATE: 18 BRPM | TEMPERATURE: 98 F | SYSTOLIC BLOOD PRESSURE: 110 MMHG | DIASTOLIC BLOOD PRESSURE: 84 MMHG

## 2023-08-25 DIAGNOSIS — J45.20 ASTHMA, MILD INTERMITTENT, WELL-CONTROLLED: ICD-10-CM

## 2023-08-25 DIAGNOSIS — E55.9 VITAMIN D INSUFFICIENCY: ICD-10-CM

## 2023-08-25 DIAGNOSIS — Z00.00 ANNUAL PHYSICAL EXAM: Primary | ICD-10-CM

## 2023-08-25 DIAGNOSIS — D58.2 HEMOGLOBIN C TRAIT: ICD-10-CM

## 2023-08-25 DIAGNOSIS — J31.0 RHINITIS, UNSPECIFIED TYPE: ICD-10-CM

## 2023-08-25 DIAGNOSIS — Z00.00 ANNUAL PHYSICAL EXAM: ICD-10-CM

## 2023-08-25 LAB
25(OH)D3+25(OH)D2 SERPL-MCNC: 36 NG/ML (ref 30–96)
ALBUMIN SERPL BCP-MCNC: 4.2 G/DL (ref 3.5–5.2)
ALP SERPL-CCNC: 56 U/L (ref 55–135)
ALT SERPL W/O P-5'-P-CCNC: 32 U/L (ref 10–44)
ANION GAP SERPL CALC-SCNC: 12 MMOL/L (ref 8–16)
AST SERPL-CCNC: 26 U/L (ref 10–40)
BASOPHILS # BLD AUTO: 0.04 K/UL (ref 0–0.2)
BASOPHILS NFR BLD: 0.7 % (ref 0–1.9)
BILIRUB SERPL-MCNC: 0.9 MG/DL (ref 0.1–1)
BUN SERPL-MCNC: 12 MG/DL (ref 6–20)
CALCIUM SERPL-MCNC: 9.7 MG/DL (ref 8.7–10.5)
CHLORIDE SERPL-SCNC: 103 MMOL/L (ref 95–110)
CO2 SERPL-SCNC: 24 MMOL/L (ref 23–29)
CREAT SERPL-MCNC: 0.9 MG/DL (ref 0.5–1.4)
DIFFERENTIAL METHOD: ABNORMAL
EOSINOPHIL # BLD AUTO: 0.3 K/UL (ref 0–0.5)
EOSINOPHIL NFR BLD: 5.1 % (ref 0–8)
ERYTHROCYTE [DISTWIDTH] IN BLOOD BY AUTOMATED COUNT: 12.4 % (ref 11.5–14.5)
EST. GFR  (NO RACE VARIABLE): >60 ML/MIN/1.73 M^2
ESTIMATED AVG GLUCOSE: 82 MG/DL (ref 68–131)
GLUCOSE SERPL-MCNC: 91 MG/DL (ref 70–110)
HBA1C MFR BLD: 4.5 % (ref 4–5.6)
HCT VFR BLD AUTO: 38.1 % (ref 40–54)
HGB BLD-MCNC: 12.8 G/DL (ref 14–18)
IMM GRANULOCYTES # BLD AUTO: 0.01 K/UL (ref 0–0.04)
IMM GRANULOCYTES NFR BLD AUTO: 0.2 % (ref 0–0.5)
LYMPHOCYTES # BLD AUTO: 2.2 K/UL (ref 1–4.8)
LYMPHOCYTES NFR BLD: 35.7 % (ref 18–48)
MCH RBC QN AUTO: 29.2 PG (ref 27–31)
MCHC RBC AUTO-ENTMCNC: 33.6 G/DL (ref 32–36)
MCV RBC AUTO: 87 FL (ref 82–98)
MONOCYTES # BLD AUTO: 0.5 K/UL (ref 0.3–1)
MONOCYTES NFR BLD: 8.9 % (ref 4–15)
NEUTROPHILS # BLD AUTO: 3 K/UL (ref 1.8–7.7)
NEUTROPHILS NFR BLD: 49.4 % (ref 38–73)
NRBC BLD-RTO: 0 /100 WBC
PLATELET # BLD AUTO: 232 K/UL (ref 150–450)
PMV BLD AUTO: 14.3 FL (ref 9.2–12.9)
POTASSIUM SERPL-SCNC: 3.7 MMOL/L (ref 3.5–5.1)
PROT SERPL-MCNC: 7.7 G/DL (ref 6–8.4)
RBC # BLD AUTO: 4.38 M/UL (ref 4.6–6.2)
SODIUM SERPL-SCNC: 139 MMOL/L (ref 136–145)
TSH SERPL DL<=0.005 MIU/L-ACNC: 1.1 UIU/ML (ref 0.4–4)
WBC # BLD AUTO: 6.08 K/UL (ref 3.9–12.7)

## 2023-08-25 PROCEDURE — 3008F BODY MASS INDEX DOCD: CPT | Mod: CPTII,S$GLB,, | Performed by: INTERNAL MEDICINE

## 2023-08-25 PROCEDURE — 3074F SYST BP LT 130 MM HG: CPT | Mod: CPTII,S$GLB,, | Performed by: INTERNAL MEDICINE

## 2023-08-25 PROCEDURE — 3008F PR BODY MASS INDEX (BMI) DOCUMENTED: ICD-10-PCS | Mod: CPTII,S$GLB,, | Performed by: INTERNAL MEDICINE

## 2023-08-25 PROCEDURE — 3079F DIAST BP 80-89 MM HG: CPT | Mod: CPTII,S$GLB,, | Performed by: INTERNAL MEDICINE

## 2023-08-25 PROCEDURE — 99999 PR PBB SHADOW E&M-EST. PATIENT-LVL IV: CPT | Mod: PBBFAC,,, | Performed by: INTERNAL MEDICINE

## 2023-08-25 PROCEDURE — 3074F PR MOST RECENT SYSTOLIC BLOOD PRESSURE < 130 MM HG: ICD-10-PCS | Mod: CPTII,S$GLB,, | Performed by: INTERNAL MEDICINE

## 2023-08-25 PROCEDURE — 1160F PR REVIEW ALL MEDS BY PRESCRIBER/CLIN PHARMACIST DOCUMENTED: ICD-10-PCS | Mod: CPTII,S$GLB,, | Performed by: INTERNAL MEDICINE

## 2023-08-25 PROCEDURE — 84443 ASSAY THYROID STIM HORMONE: CPT | Performed by: INTERNAL MEDICINE

## 2023-08-25 PROCEDURE — 3079F PR MOST RECENT DIASTOLIC BLOOD PRESSURE 80-89 MM HG: ICD-10-PCS | Mod: CPTII,S$GLB,, | Performed by: INTERNAL MEDICINE

## 2023-08-25 PROCEDURE — 80053 COMPREHEN METABOLIC PANEL: CPT | Performed by: INTERNAL MEDICINE

## 2023-08-25 PROCEDURE — 99395 PREV VISIT EST AGE 18-39: CPT | Mod: S$GLB,,, | Performed by: INTERNAL MEDICINE

## 2023-08-25 PROCEDURE — 82306 VITAMIN D 25 HYDROXY: CPT | Performed by: INTERNAL MEDICINE

## 2023-08-25 PROCEDURE — 85025 COMPLETE CBC W/AUTO DIFF WBC: CPT | Performed by: INTERNAL MEDICINE

## 2023-08-25 PROCEDURE — 1159F PR MEDICATION LIST DOCUMENTED IN MEDICAL RECORD: ICD-10-PCS | Mod: CPTII,S$GLB,, | Performed by: INTERNAL MEDICINE

## 2023-08-25 PROCEDURE — 3044F HG A1C LEVEL LT 7.0%: CPT | Mod: CPTII,S$GLB,, | Performed by: INTERNAL MEDICINE

## 2023-08-25 PROCEDURE — 83036 HEMOGLOBIN GLYCOSYLATED A1C: CPT | Performed by: INTERNAL MEDICINE

## 2023-08-25 PROCEDURE — 3044F PR MOST RECENT HEMOGLOBIN A1C LEVEL <7.0%: ICD-10-PCS | Mod: CPTII,S$GLB,, | Performed by: INTERNAL MEDICINE

## 2023-08-25 PROCEDURE — 1159F MED LIST DOCD IN RCRD: CPT | Mod: CPTII,S$GLB,, | Performed by: INTERNAL MEDICINE

## 2023-08-25 PROCEDURE — 36415 COLL VENOUS BLD VENIPUNCTURE: CPT | Mod: PO | Performed by: INTERNAL MEDICINE

## 2023-08-25 PROCEDURE — 99999 PR PBB SHADOW E&M-EST. PATIENT-LVL IV: ICD-10-PCS | Mod: PBBFAC,,, | Performed by: INTERNAL MEDICINE

## 2023-08-25 PROCEDURE — 1160F RVW MEDS BY RX/DR IN RCRD: CPT | Mod: CPTII,S$GLB,, | Performed by: INTERNAL MEDICINE

## 2023-08-25 PROCEDURE — 99395 PR PREVENTIVE VISIT,EST,18-39: ICD-10-PCS | Mod: S$GLB,,, | Performed by: INTERNAL MEDICINE

## 2023-08-25 RX ORDER — AZELASTINE 1 MG/ML
2 SPRAY, METERED NASAL 2 TIMES DAILY
COMMUNITY
Start: 2023-04-27

## 2023-08-25 RX ORDER — ALBUTEROL SULFATE 90 UG/1
2 AEROSOL, METERED RESPIRATORY (INHALATION) EVERY 4 HOURS PRN
Qty: 18 G | Refills: 3 | Status: SHIPPED | OUTPATIENT
Start: 2023-08-25

## 2023-08-25 NOTE — PROGRESS NOTES
Subjective:     Maria Ines Lea is a 28 y.o. male who presents for an annual exam.    PCP: Mirtha Arguelles MD    Medical History:   Past Medical History:   Diagnosis Date    ADHD (attention deficit hyperactivity disorder)     diagnosed 4th gradem treated with Vyvanse at one time but patient has difficulty swallowing pills    ALLERGIC RHINITIS     Asthma     Asthma, well controlled 7/20/2012    Bronchitis     Eczema     Hemoglobin C trait      Family History: family history includes Allergic rhinitis in his mother; Allergies in his maternal aunt and mother; Asthma in his maternal aunt, mother, and sister; Breast cancer in his maternal grandmother; Diabetes in his maternal grandmother and paternal grandmother; Eczema in his father; Glaucoma in his paternal grandmother; Glucose-6-phos deficiency in his father; Heart disease in his paternal grandmother; Hypertension in his maternal aunt and paternal grandmother; Migraines in his maternal aunt and mother; Other in his maternal uncle; Thyroid disease in his maternal grandmother.    Surgical History:   Past Surgical History:   Procedure Laterality Date    none        Social History:  reports that he has never smoked. He has never used smokeless tobacco. He reports current alcohol use. He reports that he does not use drugs.   Allergies: Review of patient's allergies indicates:  No Known Allergies  Medications:   Current Outpatient Medications:     azelastine (ASTELIN) 137 mcg (0.1 %) nasal spray, 2 sprays 2 (two) times daily., Disp: , Rfl:     cetirizine (ZYRTEC) 10 MG tablet, Take 1 tablet (10 mg total) by mouth once daily., Disp: 30 tablet, Rfl: 11    fluticasone (FLONASE) 50 mcg/actuation nasal spray, 1 spray (50 mcg total) by Each Nare route 2 (two) times daily., Disp: 1 Bottle, Rfl: 3    albuterol (PROAIR HFA) 90 mcg/actuation inhaler, Inhale 2 puffs into the lungs every 4 (four) hours as needed for Shortness of Breath. 1 - 2 HFA Aerosol Inhaler Inhalation  Every 4-6 hours, Disp: 18 g, Rfl: 3    Health Maintenance:   Health Maintenance Topics with due status: Not Due       Topic Last Completion Date    TETANUS VACCINE 2019    Influenza Vaccine 2022     Lab Results   Component Value Date    HEPCAB Negative 2018    [id number] Negative 2018     Eye Exam: last year  Dental Exam: regularly  HIV screenin2018, neg  Hepatitis C screenin2018, neg    Vaccinations:   Immunization History   Administered Date(s) Administered    COVID-19, MRNA, LN-S, PF (Pfizer) (Purple Cap) 2021, 2021, 2022    DTP / HiB 1997, 1997, 1998    DTaP 12/10/1998, 2001    HIB 10/27/1998    HPV Quadrivalent 2012, 10/05/2012, 02/15/2013    Hepatitis B 1997, 1997, 1998    Influenza 10/20/2006, 2009, 10/05/2012, 2013    Influenza - Quadrivalent 2016    Influenza - Quadrivalent - PF *Preferred* (6 months and older) 10/31/2014, 10/08/2020, 2022    Influenza - Trivalent (ADULT) 10/20/2006, 2009, 10/05/2012    Influenza - Trivalent - PF (ADULT) 2013    Influenza Split 10/05/2012    MMR 10/27/1998, 2001    Meningococcal Conjugate 2013    Meningococcal Conjugate (MCV4P) 2008, 2013    OPV 1997, 1997, 1998, 2001    PPD Test 2000    Pneumococcal Conjugate - 7 Valent 2001    Td - PF (ADULT) 2019    Tdap 2008    Varicella 1998, 2008       Tetanus: 2019  Prevnar-20: due  Covid vaccine: not boosted      Body mass index is 30.49 kg/m².  Wt Readings from Last 3 Encounters:   23 92.3 kg (203 lb 7.8 oz)   23 90.3 kg (199 lb 1.2 oz)   22 87.2 kg (192 lb 3.9 oz)       Review of Systems   Constitutional:  Positive for activity change and unexpected weight change. Negative for chills, diaphoresis, fatigue and fever.   HENT:  Positive for congestion (nasal, uses nasonex spray as needed) and  rhinorrhea. Negative for dental problem, ear discharge, ear pain, hearing loss, postnasal drip, sinus pressure, sore throat and trouble swallowing.    Eyes:  Negative for discharge, redness and visual disturbance.   Respiratory:  Negative for cough, chest tightness, shortness of breath and wheezing.    Cardiovascular:  Negative for chest pain, palpitations and leg swelling.   Gastrointestinal:  Negative for abdominal pain, blood in stool, constipation, diarrhea, nausea and vomiting.   Endocrine: Negative for polydipsia and polyuria.   Genitourinary:  Negative for decreased urine volume, difficulty urinating, dysuria, frequency, hematuria and urgency.   Musculoskeletal:  Positive for arthralgias. Negative for back pain, joint swelling, myalgias and neck pain.   Skin:  Negative for rash and wound.   Allergic/Immunologic: Positive for environmental allergies.   Neurological:  Positive for headaches. Negative for dizziness, weakness and numbness.   Hematological:  Negative for adenopathy.   Psychiatric/Behavioral:  Negative for confusion, dysphoric mood and sleep disturbance. The patient is not nervous/anxious.           Objective:     Physical Exam  Vitals reviewed.   Constitutional:       General: He is awake. He is not in acute distress.     Appearance: Normal appearance. He is well-developed and well-groomed. He is not ill-appearing.   HENT:      Head: Normocephalic and atraumatic.      Right Ear: Hearing, tympanic membrane, ear canal and external ear normal. Tympanic membrane is not erythematous or bulging.      Left Ear: Hearing, tympanic membrane, ear canal and external ear normal. Tympanic membrane is not erythematous or bulging.      Nose: Nose normal. No congestion.      Mouth/Throat:      Mouth: Mucous membranes are moist.      Tongue: No lesions.      Pharynx: Oropharynx is clear. Uvula midline. No oropharyngeal exudate or posterior oropharyngeal erythema.   Eyes:      General: Lids are normal. Vision  grossly intact. Gaze aligned appropriately. No scleral icterus.     Conjunctiva/sclera: Conjunctivae normal.      Right eye: Right conjunctiva is not injected.      Left eye: Left conjunctiva is not injected.      Pupils: Pupils are equal, round, and reactive to light.   Neck:      Thyroid: No thyroid mass or thyromegaly.   Cardiovascular:      Rate and Rhythm: Normal rate and regular rhythm.      Pulses: Normal pulses.      Heart sounds: Normal heart sounds. No murmur heard.  Pulmonary:      Effort: Pulmonary effort is normal. No respiratory distress.      Breath sounds: Normal breath sounds. No decreased breath sounds or wheezing.   Abdominal:      General: Bowel sounds are normal. There is no distension.      Palpations: Abdomen is soft.      Tenderness: There is no abdominal tenderness. There is no guarding or rebound.   Musculoskeletal:         General: Normal range of motion.      Cervical back: Normal range of motion and neck supple.      Right lower leg: No edema.      Left lower leg: No edema.   Lymphadenopathy:      Cervical: No cervical adenopathy.      Upper Body:      Right upper body: No supraclavicular adenopathy.      Left upper body: No supraclavicular adenopathy.   Skin:     General: Skin is warm and dry.      Coloration: Skin is not cyanotic.      Findings: No lesion or rash.      Nails: There is no clubbing.   Neurological:      General: No focal deficit present.      Mental Status: He is alert and oriented to person, place, and time.      Coordination: Coordination is intact.      Gait: Gait is intact.      Deep Tendon Reflexes: Reflexes are normal and symmetric. Reflexes normal.   Psychiatric:         Attention and Perception: Attention normal.         Mood and Affect: Mood normal.         Behavior: Behavior is cooperative.            Assessment:        1. Annual physical exam    2. Asthma, mild intermittent, well-controlled    3. Rhinitis, unspecified type    4. Vitamin D insufficiency    5.  Hemoglobin C trait           Plan:     1. Annual physical exam  - CBC Auto Differential; Future  - Comprehensive Metabolic Panel; Future  - TSH; Future  - Urinalysis; Future  - Hemoglobin A1C; Future    2. Asthma, mild intermittent, well-controlled  - uses intermittently, continue  - albuterol (PROAIR HFA) 90 mcg/actuation inhaler; Inhale 2 puffs into the lungs every 4 (four) hours as needed for Shortness of Breath. 1 - 2 HFA Aerosol Inhaler Inhalation Every 4-6 hours  Dispense: 18 g; Refill: 3    3. Rhinitis, unspecified type  - continue OTC antihistamine as needed, will monitor    4. Vitamin D insufficiency  - Vitamin D; Future    5. Hemoglobin C trait  - CBC Auto Differential; Future      RTC in 1 year for annual exam or sooner if needed    __________________________    Mirtha Arguelles MD, PharmD  [facility]  American Board of Obesity Medicine diplomate  Office [phone]

## 2023-08-27 ENCOUNTER — PATIENT MESSAGE (OUTPATIENT)
Dept: INTERNAL MEDICINE | Facility: CLINIC | Age: 28
End: 2023-08-27
Payer: COMMERCIAL

## 2023-08-28 NOTE — TELEPHONE ENCOUNTER
See result notes. Should drink more water. Anemia is stable and is likely related to hemoglobin C trait.

## 2024-11-11 ENCOUNTER — LAB VISIT (OUTPATIENT)
Dept: LAB | Facility: HOSPITAL | Age: 29
End: 2024-11-11
Attending: HOSPITALIST
Payer: COMMERCIAL

## 2024-11-11 ENCOUNTER — OFFICE VISIT (OUTPATIENT)
Dept: INTERNAL MEDICINE | Facility: CLINIC | Age: 29
End: 2024-11-11
Payer: COMMERCIAL

## 2024-11-11 VITALS
WEIGHT: 207 LBS | BODY MASS INDEX: 32.49 KG/M2 | RESPIRATION RATE: 14 BRPM | OXYGEN SATURATION: 97 % | DIASTOLIC BLOOD PRESSURE: 64 MMHG | TEMPERATURE: 97 F | HEIGHT: 67 IN | HEART RATE: 80 BPM | SYSTOLIC BLOOD PRESSURE: 110 MMHG

## 2024-11-11 DIAGNOSIS — Z00.00 ANNUAL PHYSICAL EXAM: Primary | ICD-10-CM

## 2024-11-11 DIAGNOSIS — E66.811 CLASS 1 OBESITY DUE TO EXCESS CALORIES WITHOUT SERIOUS COMORBIDITY WITH BODY MASS INDEX (BMI) OF 32.0 TO 32.9 IN ADULT: ICD-10-CM

## 2024-11-11 DIAGNOSIS — D58.2 HEMOGLOBIN C TRAIT: ICD-10-CM

## 2024-11-11 DIAGNOSIS — Z23 NEED FOR PNEUMOCOCCAL VACCINE: ICD-10-CM

## 2024-11-11 DIAGNOSIS — L60.0 INGROWN TOENAIL OF BOTH FEET: ICD-10-CM

## 2024-11-11 DIAGNOSIS — Z00.00 ANNUAL PHYSICAL EXAM: ICD-10-CM

## 2024-11-11 DIAGNOSIS — E55.9 VITAMIN D INSUFFICIENCY: ICD-10-CM

## 2024-11-11 DIAGNOSIS — E66.09 CLASS 1 OBESITY DUE TO EXCESS CALORIES WITHOUT SERIOUS COMORBIDITY WITH BODY MASS INDEX (BMI) OF 32.0 TO 32.9 IN ADULT: ICD-10-CM

## 2024-11-11 DIAGNOSIS — J45.20 ASTHMA, MILD INTERMITTENT, WELL-CONTROLLED: ICD-10-CM

## 2024-11-11 DIAGNOSIS — Z23 NEED FOR VACCINATION: ICD-10-CM

## 2024-11-11 LAB
BILIRUB UR QL STRIP: NEGATIVE
CLARITY UR REFRACT.AUTO: CLEAR
COLOR UR AUTO: YELLOW
GLUCOSE UR QL STRIP: NEGATIVE
HGB UR QL STRIP: NEGATIVE
KETONES UR QL STRIP: NEGATIVE
LEUKOCYTE ESTERASE UR QL STRIP: NEGATIVE
NITRITE UR QL STRIP: NEGATIVE
PH UR STRIP: 6 [PH] (ref 5–8)
PROT UR QL STRIP: NEGATIVE
SP GR UR STRIP: 1.02 (ref 1–1.03)
URN SPEC COLLECT METH UR: NORMAL

## 2024-11-11 PROCEDURE — 81003 URINALYSIS AUTO W/O SCOPE: CPT | Performed by: HOSPITALIST

## 2024-11-11 PROCEDURE — 1159F MED LIST DOCD IN RCRD: CPT | Mod: CPTII,S$GLB,, | Performed by: HOSPITALIST

## 2024-11-11 PROCEDURE — 99999 PR PBB SHADOW E&M-EST. PATIENT-LVL IV: CPT | Mod: PBBFAC,,, | Performed by: HOSPITALIST

## 2024-11-11 PROCEDURE — 3008F BODY MASS INDEX DOCD: CPT | Mod: CPTII,S$GLB,, | Performed by: HOSPITALIST

## 2024-11-11 PROCEDURE — 90472 IMMUNIZATION ADMIN EACH ADD: CPT | Mod: S$GLB,,, | Performed by: HOSPITALIST

## 2024-11-11 PROCEDURE — 3078F DIAST BP <80 MM HG: CPT | Mod: CPTII,S$GLB,, | Performed by: HOSPITALIST

## 2024-11-11 PROCEDURE — 99395 PREV VISIT EST AGE 18-39: CPT | Mod: 25,S$GLB,, | Performed by: HOSPITALIST

## 2024-11-11 PROCEDURE — 3074F SYST BP LT 130 MM HG: CPT | Mod: CPTII,S$GLB,, | Performed by: HOSPITALIST

## 2024-11-11 PROCEDURE — 90656 IIV3 VACC NO PRSV 0.5 ML IM: CPT | Mod: S$GLB,,, | Performed by: HOSPITALIST

## 2024-11-11 PROCEDURE — 90471 IMMUNIZATION ADMIN: CPT | Mod: S$GLB,,, | Performed by: HOSPITALIST

## 2024-11-11 PROCEDURE — 1160F RVW MEDS BY RX/DR IN RCRD: CPT | Mod: CPTII,S$GLB,, | Performed by: HOSPITALIST

## 2024-11-11 PROCEDURE — 90677 PCV20 VACCINE IM: CPT | Mod: S$GLB,,, | Performed by: HOSPITALIST

## 2024-11-11 NOTE — PROGRESS NOTES
"Subjective:     @Patient ID: Maria Ines Lea is a 29 y.o. male.    Chief Complaint: Annual Exam    HPI    28 yo male with asthma, hemoglobin c trait, eczema presents for annual:     Lipid disorders/ASCVD risk (ages >/= 45 or >/= 20 if increased risk ): ordered    Vaccines:   Influenza (yearly) due    Tetanus (every 10 yrs - 1st tdap) utd  Covid19: due for booster  Pna: due        CHIEF COMPLAINT:  Mr. Lea presents today for an annual physical and to obtain paperwork for their job.    VACCINATIONS:  He agrees to receive both flu and pneumonia vaccinations during the current visit, due for pneumonia vaccine due to asthma history.     MUSCULOSKELETAL CONCERNS:  He reports concerns with his feet, specifically toenails on his great toes curving inward, causing some discomfort. He recalls a previous incident of a pimple-like sensation under one toenail with purulent discharge. He also experiences warmth in both legs during exercise, particularly when jogging, describing a "hot feeling" around his legs. The discomfort is more pronounced with jogging compared to regular walking. He denies any leg swelling.    DIET AND EXERCISE:  He reports primarily eating meals at home, acknowledging some carbohydrate consumption and denying frequent red meat intake. He reports decreased activity level compared to his desired level, attempting to increase physical activity. He notes having a sedentary job contributing to current inactivity, often sleeping immediately after returning home from work.    LABS:  Blood work is being rescheduled for a fasting appointment as he consumed two slices of pizza and popcorn around 10:30 AM today.      ROS:    Musculoskeletal: +muscle pain       Review of Systems   Constitutional:  Positive for activity change. Negative for unexpected weight change.   HENT:  Negative for hearing loss, rhinorrhea and trouble swallowing.    Eyes:  Negative for discharge and visual disturbance.   Respiratory:  " Negative for chest tightness and wheezing.    Cardiovascular:  Negative for chest pain and palpitations.   Gastrointestinal:  Negative for blood in stool, constipation, diarrhea and vomiting.   Endocrine: Positive for polyuria. Negative for polydipsia.   Genitourinary:  Negative for difficulty urinating, hematuria and urgency.   Musculoskeletal:  Positive for arthralgias. Negative for joint swelling.   Neurological:  Negative for weakness and headaches.   Psychiatric/Behavioral:  Negative for confusion and dysphoric mood.      Past medical history, surgical history, and family medical history reviewed and updated as appropriate.    Medications and allergies reviewed.     Objective:     There were no vitals filed for this visit.  There is no height or weight on file to calculate BMI.  Physical Exam  Vitals reviewed.   Constitutional:       General: He is not in acute distress.     Appearance: He is well-developed.   HENT:      Head: Normocephalic and atraumatic.      Right Ear: Tympanic membrane normal.      Left Ear: Tympanic membrane normal.      Mouth/Throat:      Mouth: Mucous membranes are moist.      Pharynx: No oropharyngeal exudate.   Eyes:      General:         Right eye: No discharge.         Left eye: No discharge.      Conjunctiva/sclera: Conjunctivae normal.   Cardiovascular:      Rate and Rhythm: Normal rate and regular rhythm.      Heart sounds: No murmur heard.     No friction rub.   Pulmonary:      Effort: Pulmonary effort is normal.      Breath sounds: Normal breath sounds.   Abdominal:      General: Bowel sounds are normal. There is no distension.      Palpations: Abdomen is soft.      Tenderness: There is no abdominal tenderness.   Musculoskeletal:         General: Normal range of motion.      Cervical back: Normal range of motion and neck supple.      Right lower leg: No edema.      Left lower leg: No edema.   Lymphadenopathy:      Cervical: No cervical adenopathy.   Skin:     General: Skin is  warm and dry.   Neurological:      Mental Status: He is alert and oriented to person, place, and time.   Psychiatric:         Mood and Affect: Mood normal.         Behavior: Behavior normal.         Lab Results   Component Value Date    WBC 6.08 08/25/2023    HGB 12.8 (L) 08/25/2023    HCT 38.1 (L) 08/25/2023     08/25/2023    CHOL 127 12/29/2016    TRIG 51 12/29/2016    HDL 44 12/29/2016    ALT 32 08/25/2023    AST 26 08/25/2023     08/25/2023    K 3.7 08/25/2023     08/25/2023    CREATININE 0.9 08/25/2023    BUN 12 08/25/2023    CO2 24 08/25/2023    TSH 1.096 08/25/2023    HGBA1C 4.5 08/25/2023       Assessment:     1. Annual physical exam    2. Asthma, mild intermittent, well-controlled    3. Vitamin D insufficiency    4. Hemoglobin C trait    5. Class 1 obesity due to excess calories without serious comorbidity with body mass index (BMI) of 32.0 to 32.9 in adult    6. Ingrown toenail of both feet    7. Need for pneumococcal vaccine      Plan:   Maria Ines was seen today for annual exam.    Diagnoses and all orders for this visit:    Annual physical exam  -     Vitamin D; Future  -     Comprehensive Metabolic Panel; Future  -     CBC Auto Differential; Future  -     Lipid Panel; Future  -     TSH; Future  -     Hemoglobin A1C; Future  -     Urinalysis; Future  -     pneumoc 20-chin conj-dip cr(PF) (PREVNAR-20 (PF)) injection Syrg 0.5 mL    Asthma, mild intermittent, well-controlled  -     Vitamin D; Future  -     Comprehensive Metabolic Panel; Future  -     CBC Auto Differential; Future  -     Lipid Panel; Future  -     TSH; Future  -     Hemoglobin A1C; Future  -     Urinalysis; Future  -     pneumoc 20-chin conj-dip cr(PF) (PREVNAR-20 (PF)) injection Syrg 0.5 mL    Vitamin D insufficiency  -     Vitamin D; Future  -     Comprehensive Metabolic Panel; Future  -     CBC Auto Differential; Future  -     Lipid Panel; Future  -     TSH; Future  -     Hemoglobin A1C; Future  -     Urinalysis;  Future    Hemoglobin C trait  -     Vitamin D; Future  -     Comprehensive Metabolic Panel; Future  -     CBC Auto Differential; Future  -     Lipid Panel; Future  -     TSH; Future  -     Hemoglobin A1C; Future  -     Urinalysis; Future    Class 1 obesity due to excess calories without serious comorbidity with body mass index (BMI) of 32.0 to 32.9 in adult  -     Vitamin D; Future  -     Comprehensive Metabolic Panel; Future  -     CBC Auto Differential; Future  -     Lipid Panel; Future  -     TSH; Future  -     Hemoglobin A1C; Future  -     Urinalysis; Future    Ingrown toenail of both feet  -     Ambulatory referral/consult to Podiatry; Future    Need for pneumococcal vaccine  -     pneumoc 20-chin conj-dip cr(PF) (PREVNAR-20 (PF)) injection Syrg 0.5 mL      Assessment & Plan    WEIGHT MANAGEMENT:   Provided information on dietary modifications for weight management, including emphasis on lean proteins, vegetables, and reducing carbohydrate intake.   Mr. Lea to increase physical activity, starting with 20-minute walks a few days a week.   Mr. Lea to focus on lean meats (chicken, turkey, fish) and vegetables in diet.   Recommend reducing intake of carbohydrates, including breads, rice, starches, and potatoes.   Mr. Lea to limit consumption of juices due to elevated sugar content.    LEG DISCOMFORT:   Discussed benefits of compression wear for leg discomfort during exercise.   Mr. Lea can consider wearing compression tights during exercise to help with circulation and swelling.    INGROWN TOENAILS:   Referred to podiatry for ingrown toenails on both great toes.    FLU VACCINATION:   Flu shot administered during current visit.    PNEUMONIA VACCINATION:   Pneumonia vaccine administered during current visit.    LABS:   Educated on importance of fasting before labs.   Fasting labs ordered for 11:00 AM.   Follow up for fasting labs at 11:00 AM today.         Sole Conde MD  Internal  Medicine    11/11/2024    This note was generated with the assistance of ambient listening technology. Verbal consent was obtained by the patient and accompanying visitor(s) for the recording of patient appointment to facilitate this note. I attest to having reviewed and edited the generated note for accuracy, though some syntax or spelling errors may persist. Please contact the author of this note for any clarification.

## 2024-11-15 ENCOUNTER — PATIENT MESSAGE (OUTPATIENT)
Dept: PODIATRY | Facility: CLINIC | Age: 29
End: 2024-11-15
Payer: COMMERCIAL

## 2024-11-18 ENCOUNTER — TELEPHONE (OUTPATIENT)
Dept: PODIATRY | Facility: CLINIC | Age: 29
End: 2024-11-18
Payer: COMMERCIAL

## 2024-11-18 NOTE — TELEPHONE ENCOUNTER
Left vm to assist patient with rescheduling as new patient with another provider. Reminder in the mail on today.

## 2024-12-05 ENCOUNTER — OFFICE VISIT (OUTPATIENT)
Dept: PODIATRY | Facility: CLINIC | Age: 29
End: 2024-12-05
Payer: COMMERCIAL

## 2024-12-05 VITALS — HEART RATE: 130 BPM | SYSTOLIC BLOOD PRESSURE: 128 MMHG | DIASTOLIC BLOOD PRESSURE: 77 MMHG

## 2024-12-05 DIAGNOSIS — M79.675 TOE PAIN, LEFT: Primary | ICD-10-CM

## 2024-12-05 DIAGNOSIS — L60.0 INGROWN TOENAIL OF BOTH FEET: ICD-10-CM

## 2024-12-05 DIAGNOSIS — M79.674 TOE PAIN, RIGHT: ICD-10-CM

## 2024-12-05 PROCEDURE — 1159F MED LIST DOCD IN RCRD: CPT | Mod: CPTII,S$GLB,, | Performed by: PODIATRIST

## 2024-12-05 PROCEDURE — 1160F RVW MEDS BY RX/DR IN RCRD: CPT | Mod: CPTII,S$GLB,, | Performed by: PODIATRIST

## 2024-12-05 PROCEDURE — 99999 PR PBB SHADOW E&M-EST. PATIENT-LVL III: CPT | Mod: PBBFAC,,, | Performed by: PODIATRIST

## 2024-12-05 PROCEDURE — 3074F SYST BP LT 130 MM HG: CPT | Mod: CPTII,S$GLB,, | Performed by: PODIATRIST

## 2024-12-05 PROCEDURE — 99203 OFFICE O/P NEW LOW 30 MIN: CPT | Mod: S$GLB,,, | Performed by: PODIATRIST

## 2024-12-05 PROCEDURE — 3078F DIAST BP <80 MM HG: CPT | Mod: CPTII,S$GLB,, | Performed by: PODIATRIST

## 2024-12-05 PROCEDURE — 3044F HG A1C LEVEL LT 7.0%: CPT | Mod: CPTII,S$GLB,, | Performed by: PODIATRIST

## 2024-12-05 NOTE — PROGRESS NOTES
CHIEF CONCERN: Ingrown Toenail (Ingrown toenail fo both feet)         HPI:    Maria Ines Lea is a 29 y.o. male with concerns of painful toenail on the bilateral hallux.    The pain started year ago.    Pain aggravated by direct pressure and close toe shoes.   Patient denies acute trauma to the toe.    Home treatment:  clipping      Patient Active Problem List   Diagnosis    Rhinitis    Asthma, well controlled    Hemoglobin C trait    Vitamin D insufficiency    History of eczema    Left wrist pain           Current Outpatient Medications on File Prior to Visit   Medication Sig Dispense Refill    albuterol (PROAIR HFA) 90 mcg/actuation inhaler Inhale 2 puffs into the lungs every 4 (four) hours as needed for Shortness of Breath. 1 - 2 HFA Aerosol Inhaler Inhalation Every 4-6 hours 18 g 3    azelastine (ASTELIN) 137 mcg (0.1 %) nasal spray 2 sprays 2 (two) times daily.      fluticasone (FLONASE) 50 mcg/actuation nasal spray 1 spray (50 mcg total) by Each Nare route 2 (two) times daily. 1 Bottle 3    cetirizine (ZYRTEC) 10 MG tablet Take 1 tablet (10 mg total) by mouth once daily. 30 tablet 11     No current facility-administered medications on file prior to visit.            Review of patient's allergies indicates:   Allergen Reactions    Cat dander Other (See Comments)    Dog dander Other (See Comments)    Grass pollen Other (See Comments)         ROS:  General ROS: negative for chills, fatigue or fever  Cardiovascular ROS: no chest pain or dyspnea on exertion  Musculoskeletal ROS: negative for - joint pain or joint stiffness.  Negative for loss of strength  Neuro ROS: Negative for syncope, numbness, or muscle weakness  Skin ROS: Negative for rash, itching or hair changes.  +Toenail changes        EXAM:   Vitals:    12/05/24 1502   BP: 128/77   Pulse: (!) 130       LOWER EXTREMITY EXAM:    Vascular:    Dorsalis pedis and posterior tibial pulses are palpable. capillary refill time is within normal limits   Toes  are warm to touch.    There is  presence of digital hair.    There is  mild localized edema to the affected toe.     Neurological:    Light touch, proprioception, and sharp/dull sensation are all intact.   Mulders click:  Absent  Tinels:  Absent.   No LOPS    Dermatological:    The toenail is incurvated, Lateral border of the bilateral hallux.      no erythema noted to the affected area.     Absent paronychia.     Absent abscess      Musculoskeletal:    Muscle strength is 5/5 in all groups .    No swelling/crepitus at the interphalangeal joints.        ASSESSMENT/PLAN     Problem List Items Addressed This Visit    None  Visit Diagnoses       Toe pain, left    -  Primary    Ingrown toenail of both feet        Toe pain, right                  I counseled the patient on the patient's  conditions, their implications and medical management.   Exacerbation of chronic ingrown toenail pain.   General nail care measures for abnormal nails include:  Keeping nails trimmed short, but avoid overzealous trimming  Avoiding trauma   Avoiding contact irritants   Keeping nails dry (avoiding wet work)  Avoiding all nail cosmetics  Wearing shoes that fit well at the toe box.  Avoid tight shoes.   OTC NSAIDs as needed for pain.   Schedule for chemical matrixectomy

## 2025-03-03 ENCOUNTER — HOSPITAL ENCOUNTER (EMERGENCY)
Facility: OTHER | Age: 30
Discharge: HOME | End: 2025-03-03
Attending: EMERGENCY MEDICINE
Payer: COMMERCIAL

## 2025-03-03 VITALS
WEIGHT: 190 LBS | HEIGHT: 67 IN | SYSTOLIC BLOOD PRESSURE: 123 MMHG | TEMPERATURE: 99 F | DIASTOLIC BLOOD PRESSURE: 77 MMHG | BODY MASS INDEX: 29.82 KG/M2 | OXYGEN SATURATION: 96 % | HEART RATE: 62 BPM | RESPIRATION RATE: 16 BRPM

## 2025-03-03 DIAGNOSIS — S16.1XXA STRAIN OF NECK MUSCLE, INITIAL ENCOUNTER: ICD-10-CM
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
## 2025-03-03 DIAGNOSIS — M54.50 ACUTE BILATERAL LOW BACK PAIN WITHOUT SCIATICA: ICD-10-CM

## 2025-03-03 PROCEDURE — 25000003 PHARM REV CODE 250: Performed by: EMERGENCY MEDICINE

## 2025-03-03 PROCEDURE — 96372 THER/PROPH/DIAG INJ SC/IM: CPT | Performed by: EMERGENCY MEDICINE

## 2025-03-03 PROCEDURE — 99284 EMERGENCY DEPT VISIT MOD MDM: CPT | Mod: 25

## 2025-03-03 PROCEDURE — 63600175 PHARM REV CODE 636 W HCPCS: Performed by: EMERGENCY MEDICINE

## 2025-03-03 RX ORDER — ORPHENADRINE CITRATE 30 MG/ML
60 INJECTION INTRAMUSCULAR; INTRAVENOUS
Status: COMPLETED | OUTPATIENT
Start: 2025-03-03 | End: 2025-03-03

## 2025-03-03 RX ORDER — LIDOCAINE 50 MG/G
2 PATCH TOPICAL DAILY
Qty: 14 PATCH | Refills: 0 | Status: SHIPPED | OUTPATIENT
Start: 2025-03-03 | End: 2025-03-10

## 2025-03-03 RX ORDER — LIDOCAINE 50 MG/G
2 PATCH TOPICAL
Status: DISCONTINUED | OUTPATIENT
Start: 2025-03-03 | End: 2025-03-03 | Stop reason: HOSPADM

## 2025-03-03 RX ORDER — KETOROLAC TROMETHAMINE 30 MG/ML
30 INJECTION, SOLUTION INTRAMUSCULAR; INTRAVENOUS
Status: COMPLETED | OUTPATIENT
Start: 2025-03-03 | End: 2025-03-03

## 2025-03-03 RX ADMIN — LIDOCAINE 2 PATCH: 50 PATCH CUTANEOUS at 03:03

## 2025-03-03 RX ADMIN — KETOROLAC TROMETHAMINE 30 MG: 30 INJECTION, SOLUTION INTRAMUSCULAR at 03:03

## 2025-03-03 RX ADMIN — ORPHENADRINE CITRATE 60 MG: 60 INJECTION INTRAMUSCULAR; INTRAVENOUS at 03:03
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
## 2025-03-03 NOTE — Clinical Note
"Maria Ines Banerjee"Venu was seen and treated in our emergency department on 3/3/2025.  He may return to work on 03/06/2025.       If you have any questions or concerns, please don't hesitate to call.      Tierra GIRARD    "
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
## 2025-03-06 ENCOUNTER — OFFICE VISIT (OUTPATIENT)
Dept: INTERNAL MEDICINE | Facility: CLINIC | Age: 30
End: 2025-03-06
Payer: COMMERCIAL

## 2025-03-06 VITALS
OXYGEN SATURATION: 97 % | HEIGHT: 68 IN | HEART RATE: 75 BPM | BODY MASS INDEX: 30.78 KG/M2 | DIASTOLIC BLOOD PRESSURE: 78 MMHG | WEIGHT: 203.06 LBS | SYSTOLIC BLOOD PRESSURE: 120 MMHG | TEMPERATURE: 99 F | RESPIRATION RATE: 18 BRPM

## 2025-03-06 DIAGNOSIS — M54.2 NECK PAIN ON LEFT SIDE: ICD-10-CM
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
## 2025-03-06 DIAGNOSIS — M54.50 ACUTE LOW BACK PAIN, UNSPECIFIED BACK PAIN LATERALITY, UNSPECIFIED WHETHER SCIATICA PRESENT: ICD-10-CM

## 2025-03-06 PROCEDURE — 99999 PR PBB SHADOW E&M-EST. PATIENT-LVL IV: CPT | Mod: PBBFAC,,, | Performed by: NURSE PRACTITIONER
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
## 2025-03-27 ENCOUNTER — OFFICE VISIT (OUTPATIENT)
Dept: INTERNAL MEDICINE | Facility: CLINIC | Age: 30
End: 2025-03-27
Payer: COMMERCIAL

## 2025-03-27 DIAGNOSIS — M54.50 ACUTE LOW BACK PAIN, UNSPECIFIED BACK PAIN LATERALITY, UNSPECIFIED WHETHER SCIATICA PRESENT: ICD-10-CM

## 2025-03-27 DIAGNOSIS — M54.2 NECK PAIN ON LEFT SIDE: ICD-10-CM
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
## 2025-03-27 DIAGNOSIS — Z00.00 ENCOUNTER FOR ANNUAL HEALTH EXAMINATION: ICD-10-CM
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
## 2025-04-16 ENCOUNTER — CLINICAL SUPPORT (OUTPATIENT)
Dept: REHABILITATION | Facility: OTHER | Age: 30
End: 2025-04-16
Payer: COMMERCIAL

## 2025-04-16 DIAGNOSIS — M54.50 ACUTE LOW BACK PAIN, UNSPECIFIED BACK PAIN LATERALITY, UNSPECIFIED WHETHER SCIATICA PRESENT: ICD-10-CM

## 2025-04-16 DIAGNOSIS — R53.1 DECREASED STRENGTH, ENDURANCE, AND MOBILITY: ICD-10-CM
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
## 2025-04-16 DIAGNOSIS — R68.89 DECREASED STRENGTH, ENDURANCE, AND MOBILITY: ICD-10-CM

## 2025-04-16 DIAGNOSIS — Z74.09 DECREASED STRENGTH, ENDURANCE, AND MOBILITY: ICD-10-CM

## 2025-04-16 DIAGNOSIS — R29.3 POSTURAL IMBALANCE: Primary | ICD-10-CM

## 2025-04-16 DIAGNOSIS — M54.2 NECK PAIN ON LEFT SIDE: ICD-10-CM

## 2025-04-16 PROCEDURE — 97161 PT EVAL LOW COMPLEX 20 MIN: CPT | Mod: PN
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
## 2025-04-22 ENCOUNTER — PATIENT MESSAGE (OUTPATIENT)
Dept: REHABILITATION | Facility: OTHER | Age: 30
End: 2025-04-22

## 2025-04-23 ENCOUNTER — PATIENT MESSAGE (OUTPATIENT)
Dept: REHABILITATION | Facility: OTHER | Age: 30
End: 2025-04-23
Payer: COMMERCIAL

## 2025-04-24 ENCOUNTER — CLINICAL SUPPORT (OUTPATIENT)
Dept: REHABILITATION | Facility: OTHER | Age: 30
End: 2025-04-24
Payer: COMMERCIAL

## 2025-04-24 DIAGNOSIS — R68.89 DECREASED STRENGTH, ENDURANCE, AND MOBILITY: ICD-10-CM

## 2025-04-24 DIAGNOSIS — R29.3 POSTURAL IMBALANCE: Primary | ICD-10-CM

## 2025-04-24 DIAGNOSIS — R53.1 DECREASED STRENGTH, ENDURANCE, AND MOBILITY: ICD-10-CM

## 2025-04-24 DIAGNOSIS — Z74.09 DECREASED STRENGTH, ENDURANCE, AND MOBILITY: ICD-10-CM

## 2025-04-24 PROCEDURE — 97140 MANUAL THERAPY 1/> REGIONS: CPT | Mod: PN

## 2025-04-24 PROCEDURE — 97112 NEUROMUSCULAR REEDUCATION: CPT | Mod: PN

## 2025-04-24 PROCEDURE — 97110 THERAPEUTIC EXERCISES: CPT | Mod: PN

## 2025-04-25 PROBLEM — R29.3 POSTURAL IMBALANCE: Status: ACTIVE | Noted: 2025-04-25

## 2025-04-25 PROBLEM — R53.1 DECREASED STRENGTH, ENDURANCE, AND MOBILITY: Status: ACTIVE | Noted: 2025-04-25

## 2025-04-25 PROBLEM — Z74.09 DECREASED STRENGTH, ENDURANCE, AND MOBILITY: Status: ACTIVE | Noted: 2025-04-25

## 2025-04-25 PROBLEM — R68.89 DECREASED STRENGTH, ENDURANCE, AND MOBILITY: Status: ACTIVE | Noted: 2025-04-25

## 2025-04-29 ENCOUNTER — CLINICAL SUPPORT (OUTPATIENT)
Dept: REHABILITATION | Facility: OTHER | Age: 30
End: 2025-04-29
Payer: COMMERCIAL

## 2025-04-29 DIAGNOSIS — R68.89 DECREASED STRENGTH, ENDURANCE, AND MOBILITY: ICD-10-CM

## 2025-04-29 DIAGNOSIS — R53.1 DECREASED STRENGTH, ENDURANCE, AND MOBILITY: ICD-10-CM

## 2025-04-29 DIAGNOSIS — R29.3 POSTURAL IMBALANCE: Primary | ICD-10-CM

## 2025-04-29 DIAGNOSIS — Z74.09 DECREASED STRENGTH, ENDURANCE, AND MOBILITY: ICD-10-CM

## 2025-04-29 PROCEDURE — 97112 NEUROMUSCULAR REEDUCATION: CPT | Mod: PN

## 2025-04-29 PROCEDURE — 97140 MANUAL THERAPY 1/> REGIONS: CPT | Mod: PN

## 2025-04-29 PROCEDURE — 97110 THERAPEUTIC EXERCISES: CPT | Mod: PN
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
## 2025-05-06 ENCOUNTER — CLINICAL SUPPORT (OUTPATIENT)
Dept: REHABILITATION | Facility: OTHER | Age: 30
End: 2025-05-06
Payer: COMMERCIAL

## 2025-05-06 DIAGNOSIS — R68.89 DECREASED STRENGTH, ENDURANCE, AND MOBILITY: ICD-10-CM

## 2025-05-06 DIAGNOSIS — R53.1 DECREASED STRENGTH, ENDURANCE, AND MOBILITY: ICD-10-CM

## 2025-05-06 DIAGNOSIS — R29.3 POSTURAL IMBALANCE: Primary | ICD-10-CM

## 2025-05-06 DIAGNOSIS — Z74.09 DECREASED STRENGTH, ENDURANCE, AND MOBILITY: ICD-10-CM

## 2025-05-06 PROCEDURE — 97112 NEUROMUSCULAR REEDUCATION: CPT | Mod: PN

## 2025-05-06 PROCEDURE — 97110 THERAPEUTIC EXERCISES: CPT | Mod: PN

## 2025-05-06 PROCEDURE — 97140 MANUAL THERAPY 1/> REGIONS: CPT | Mod: PN
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
## 2025-05-08 ENCOUNTER — CLINICAL SUPPORT (OUTPATIENT)
Dept: REHABILITATION | Facility: OTHER | Age: 30
End: 2025-05-08
Payer: COMMERCIAL

## 2025-05-08 DIAGNOSIS — R68.89 DECREASED STRENGTH, ENDURANCE, AND MOBILITY: ICD-10-CM

## 2025-05-08 DIAGNOSIS — R53.1 DECREASED STRENGTH, ENDURANCE, AND MOBILITY: ICD-10-CM

## 2025-05-08 DIAGNOSIS — Z74.09 DECREASED STRENGTH, ENDURANCE, AND MOBILITY: ICD-10-CM

## 2025-05-08 DIAGNOSIS — R29.3 POSTURAL IMBALANCE: Primary | ICD-10-CM

## 2025-05-08 PROCEDURE — 97110 THERAPEUTIC EXERCISES: CPT | Mod: PN

## 2025-05-08 PROCEDURE — 97140 MANUAL THERAPY 1/> REGIONS: CPT | Mod: PN
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
## 2025-05-13 ENCOUNTER — CLINICAL SUPPORT (OUTPATIENT)
Dept: REHABILITATION | Facility: OTHER | Age: 30
End: 2025-05-13
Payer: COMMERCIAL

## 2025-05-13 DIAGNOSIS — R68.89 DECREASED STRENGTH, ENDURANCE, AND MOBILITY: ICD-10-CM

## 2025-05-13 DIAGNOSIS — Z74.09 DECREASED STRENGTH, ENDURANCE, AND MOBILITY: ICD-10-CM

## 2025-05-13 DIAGNOSIS — R53.1 DECREASED STRENGTH, ENDURANCE, AND MOBILITY: ICD-10-CM

## 2025-05-13 DIAGNOSIS — R29.3 POSTURAL IMBALANCE: Primary | ICD-10-CM

## 2025-05-13 PROCEDURE — 97110 THERAPEUTIC EXERCISES: CPT | Mod: PN

## 2025-05-13 PROCEDURE — 97140 MANUAL THERAPY 1/> REGIONS: CPT | Mod: PN

## 2025-05-13 PROCEDURE — 97112 NEUROMUSCULAR REEDUCATION: CPT | Mod: PN
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
## 2025-05-14 ENCOUNTER — OFFICE VISIT (OUTPATIENT)
Dept: INTERNAL MEDICINE | Facility: CLINIC | Age: 30
End: 2025-05-14
Payer: COMMERCIAL

## 2025-05-14 ENCOUNTER — TELEPHONE (OUTPATIENT)
Dept: INTERNAL MEDICINE | Facility: CLINIC | Age: 30
End: 2025-05-14

## 2025-05-14 VITALS
RESPIRATION RATE: 18 BRPM | TEMPERATURE: 97 F | DIASTOLIC BLOOD PRESSURE: 64 MMHG | BODY MASS INDEX: 31.11 KG/M2 | HEIGHT: 68 IN | SYSTOLIC BLOOD PRESSURE: 122 MMHG | HEART RATE: 78 BPM | OXYGEN SATURATION: 97 % | WEIGHT: 205.25 LBS

## 2025-05-14 DIAGNOSIS — Z00.00 ANNUAL PHYSICAL EXAM: Primary | ICD-10-CM

## 2025-05-14 DIAGNOSIS — J45.20 ASTHMA, MILD INTERMITTENT, WELL-CONTROLLED: ICD-10-CM

## 2025-05-14 DIAGNOSIS — R53.83 FATIGUE, UNSPECIFIED TYPE: ICD-10-CM

## 2025-05-14 DIAGNOSIS — D58.2 HEMOGLOBIN C TRAIT: ICD-10-CM

## 2025-05-14 DIAGNOSIS — E04.9 PALPABLE THYROID: ICD-10-CM

## 2025-05-14 DIAGNOSIS — E55.9 VITAMIN D INSUFFICIENCY: ICD-10-CM

## 2025-05-14 PROCEDURE — 3074F SYST BP LT 130 MM HG: CPT | Mod: CPTII,S$GLB,, | Performed by: INTERNAL MEDICINE

## 2025-05-14 PROCEDURE — 3008F BODY MASS INDEX DOCD: CPT | Mod: CPTII,S$GLB,, | Performed by: INTERNAL MEDICINE

## 2025-05-14 PROCEDURE — 1160F RVW MEDS BY RX/DR IN RCRD: CPT | Mod: CPTII,S$GLB,, | Performed by: INTERNAL MEDICINE

## 2025-05-14 PROCEDURE — 1159F MED LIST DOCD IN RCRD: CPT | Mod: CPTII,S$GLB,, | Performed by: INTERNAL MEDICINE

## 2025-05-14 PROCEDURE — 3078F DIAST BP <80 MM HG: CPT | Mod: CPTII,S$GLB,, | Performed by: INTERNAL MEDICINE

## 2025-05-14 PROCEDURE — 99395 PREV VISIT EST AGE 18-39: CPT | Mod: S$GLB,,, | Performed by: INTERNAL MEDICINE

## 2025-05-14 PROCEDURE — 99999 PR PBB SHADOW E&M-EST. PATIENT-LVL IV: CPT | Mod: PBBFAC,,, | Performed by: INTERNAL MEDICINE

## 2025-05-14 RX ORDER — ALBUTEROL SULFATE 90 UG/1
2 INHALANT RESPIRATORY (INHALATION) EVERY 4 HOURS PRN
Qty: 18 G | Refills: 3 | Status: SHIPPED | OUTPATIENT
Start: 2025-05-14

## 2025-05-14 RX ORDER — LORATADINE 5 MG/1
TABLET, CHEWABLE ORAL
COMMUNITY

## 2025-05-14 NOTE — TELEPHONE ENCOUNTER
----- Message from Mirtha Arguelles MD sent at 5/14/2025  4:07 PM CDT -----  >>>>>>>>>> annual recall 5/15/2026

## 2025-05-20 ENCOUNTER — CLINICAL SUPPORT (OUTPATIENT)
Dept: REHABILITATION | Facility: OTHER | Age: 30
End: 2025-05-20
Payer: COMMERCIAL

## 2025-05-20 DIAGNOSIS — R29.3 POSTURAL IMBALANCE: Primary | ICD-10-CM

## 2025-05-20 DIAGNOSIS — R68.89 DECREASED STRENGTH, ENDURANCE, AND MOBILITY: ICD-10-CM

## 2025-05-20 DIAGNOSIS — Z74.09 DECREASED STRENGTH, ENDURANCE, AND MOBILITY: ICD-10-CM

## 2025-05-20 DIAGNOSIS — R53.1 DECREASED STRENGTH, ENDURANCE, AND MOBILITY: ICD-10-CM

## 2025-05-20 PROCEDURE — 97110 THERAPEUTIC EXERCISES: CPT | Mod: PN
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
## 2025-05-21 ENCOUNTER — HOSPITAL ENCOUNTER (OUTPATIENT)
Dept: RADIOLOGY | Facility: HOSPITAL | Age: 30
Discharge: HOME | End: 2025-05-21
Attending: INTERNAL MEDICINE
Payer: COMMERCIAL

## 2025-05-21 DIAGNOSIS — E04.9 PALPABLE THYROID: ICD-10-CM

## 2025-05-21 PROCEDURE — 76536 US EXAM OF HEAD AND NECK: CPT | Mod: TC

## 2025-05-21 PROCEDURE — 76536 US EXAM OF HEAD AND NECK: CPT | Mod: 26,,, | Performed by: RADIOLOGY

## 2025-05-22 ENCOUNTER — CLINICAL SUPPORT (OUTPATIENT)
Dept: REHABILITATION | Facility: OTHER | Age: 30
End: 2025-05-22
Payer: COMMERCIAL

## 2025-05-22 DIAGNOSIS — Z74.09 DECREASED STRENGTH, ENDURANCE, AND MOBILITY: ICD-10-CM

## 2025-05-22 DIAGNOSIS — R53.1 DECREASED STRENGTH, ENDURANCE, AND MOBILITY: ICD-10-CM

## 2025-05-22 DIAGNOSIS — R68.89 DECREASED STRENGTH, ENDURANCE, AND MOBILITY: ICD-10-CM

## 2025-05-22 DIAGNOSIS — R29.3 POSTURAL IMBALANCE: Primary | ICD-10-CM

## 2025-05-22 PROCEDURE — 97110 THERAPEUTIC EXERCISES: CPT | Mod: PN

## 2025-05-22 PROCEDURE — 97140 MANUAL THERAPY 1/> REGIONS: CPT | Mod: PN

## 2025-05-23 ENCOUNTER — RESULTS FOLLOW-UP (OUTPATIENT)
Dept: INTERNAL MEDICINE | Facility: CLINIC | Age: 30
End: 2025-05-23
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
## 2025-05-27 ENCOUNTER — CLINICAL SUPPORT (OUTPATIENT)
Dept: REHABILITATION | Facility: OTHER | Age: 30
End: 2025-05-27
Payer: COMMERCIAL

## 2025-05-27 DIAGNOSIS — R53.1 DECREASED STRENGTH, ENDURANCE, AND MOBILITY: ICD-10-CM

## 2025-05-27 DIAGNOSIS — R68.89 DECREASED STRENGTH, ENDURANCE, AND MOBILITY: ICD-10-CM

## 2025-05-27 DIAGNOSIS — R29.3 POSTURAL IMBALANCE: Primary | ICD-10-CM

## 2025-05-27 DIAGNOSIS — Z74.09 DECREASED STRENGTH, ENDURANCE, AND MOBILITY: ICD-10-CM

## 2025-05-27 PROCEDURE — 97110 THERAPEUTIC EXERCISES: CPT | Mod: PN

## 2025-05-27 PROCEDURE — 97140 MANUAL THERAPY 1/> REGIONS: CPT | Mod: PN
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
## 2025-05-29 ENCOUNTER — CLINICAL SUPPORT (OUTPATIENT)
Dept: REHABILITATION | Facility: OTHER | Age: 30
End: 2025-05-29
Payer: COMMERCIAL

## 2025-05-29 DIAGNOSIS — R53.1 DECREASED STRENGTH, ENDURANCE, AND MOBILITY: ICD-10-CM

## 2025-05-29 DIAGNOSIS — R29.3 POSTURAL IMBALANCE: Primary | ICD-10-CM

## 2025-05-29 DIAGNOSIS — Z74.09 DECREASED STRENGTH, ENDURANCE, AND MOBILITY: ICD-10-CM

## 2025-05-29 DIAGNOSIS — R68.89 DECREASED STRENGTH, ENDURANCE, AND MOBILITY: ICD-10-CM

## 2025-05-29 PROCEDURE — 97110 THERAPEUTIC EXERCISES: CPT | Mod: PN
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
## 2025-06-03 ENCOUNTER — CLINICAL SUPPORT (OUTPATIENT)
Dept: REHABILITATION | Facility: OTHER | Age: 30
End: 2025-06-03
Payer: COMMERCIAL

## 2025-06-03 DIAGNOSIS — Z74.09 DECREASED STRENGTH, ENDURANCE, AND MOBILITY: ICD-10-CM

## 2025-06-03 DIAGNOSIS — R29.3 POSTURAL IMBALANCE: Primary | ICD-10-CM

## 2025-06-03 DIAGNOSIS — R68.89 DECREASED STRENGTH, ENDURANCE, AND MOBILITY: ICD-10-CM

## 2025-06-03 DIAGNOSIS — R53.1 DECREASED STRENGTH, ENDURANCE, AND MOBILITY: ICD-10-CM

## 2025-06-03 PROCEDURE — 97110 THERAPEUTIC EXERCISES: CPT | Mod: PN

## 2025-06-03 PROCEDURE — 97140 MANUAL THERAPY 1/> REGIONS: CPT | Mod: PN

## 2025-06-05 ENCOUNTER — CLINICAL SUPPORT (OUTPATIENT)
Dept: REHABILITATION | Facility: OTHER | Age: 30
End: 2025-06-05
Payer: COMMERCIAL

## 2025-06-05 DIAGNOSIS — Z74.09 DECREASED STRENGTH, ENDURANCE, AND MOBILITY: ICD-10-CM

## 2025-06-05 DIAGNOSIS — R53.1 DECREASED STRENGTH, ENDURANCE, AND MOBILITY: ICD-10-CM

## 2025-06-05 DIAGNOSIS — R29.3 POSTURAL IMBALANCE: Primary | ICD-10-CM

## 2025-06-05 DIAGNOSIS — R68.89 DECREASED STRENGTH, ENDURANCE, AND MOBILITY: ICD-10-CM

## 2025-06-05 PROCEDURE — 97110 THERAPEUTIC EXERCISES: CPT | Mod: PN

## 2025-06-05 PROCEDURE — 97140 MANUAL THERAPY 1/> REGIONS: CPT | Mod: PN

## 2025-06-05 PROCEDURE — 97530 THERAPEUTIC ACTIVITIES: CPT | Mod: PN

## 2025-06-10 ENCOUNTER — CLINICAL SUPPORT (OUTPATIENT)
Dept: REHABILITATION | Facility: OTHER | Age: 30
End: 2025-06-10
Payer: COMMERCIAL

## 2025-06-10 DIAGNOSIS — Z74.09 DECREASED STRENGTH, ENDURANCE, AND MOBILITY: ICD-10-CM

## 2025-06-10 DIAGNOSIS — R53.1 DECREASED STRENGTH, ENDURANCE, AND MOBILITY: ICD-10-CM

## 2025-06-10 DIAGNOSIS — R68.89 DECREASED STRENGTH, ENDURANCE, AND MOBILITY: ICD-10-CM

## 2025-06-10 DIAGNOSIS — R29.3 POSTURAL IMBALANCE: Primary | ICD-10-CM

## 2025-06-10 PROCEDURE — 97530 THERAPEUTIC ACTIVITIES: CPT | Mod: PN

## 2025-06-10 PROCEDURE — 97140 MANUAL THERAPY 1/> REGIONS: CPT | Mod: PN

## 2025-06-10 PROCEDURE — 97110 THERAPEUTIC EXERCISES: CPT | Mod: PN
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
## 2025-06-12 ENCOUNTER — CLINICAL SUPPORT (OUTPATIENT)
Dept: REHABILITATION | Facility: OTHER | Age: 30
End: 2025-06-12
Payer: COMMERCIAL

## 2025-06-12 DIAGNOSIS — R53.1 DECREASED STRENGTH, ENDURANCE, AND MOBILITY: ICD-10-CM

## 2025-06-12 DIAGNOSIS — R68.89 DECREASED STRENGTH, ENDURANCE, AND MOBILITY: ICD-10-CM

## 2025-06-12 DIAGNOSIS — R29.3 POSTURAL IMBALANCE: Primary | ICD-10-CM

## 2025-06-12 DIAGNOSIS — Z74.09 DECREASED STRENGTH, ENDURANCE, AND MOBILITY: ICD-10-CM

## 2025-06-12 PROCEDURE — 97530 THERAPEUTIC ACTIVITIES: CPT | Mod: PN

## 2025-06-12 PROCEDURE — 97110 THERAPEUTIC EXERCISES: CPT | Mod: PN

## 2025-06-12 PROCEDURE — 97112 NEUROMUSCULAR REEDUCATION: CPT | Mod: PN

## 2025-06-12 PROCEDURE — 97140 MANUAL THERAPY 1/> REGIONS: CPT | Mod: PN
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
## 2025-06-17 ENCOUNTER — CLINICAL SUPPORT (OUTPATIENT)
Dept: REHABILITATION | Facility: OTHER | Age: 30
End: 2025-06-17
Payer: COMMERCIAL

## 2025-06-17 DIAGNOSIS — R29.3 POSTURAL IMBALANCE: Primary | ICD-10-CM

## 2025-06-17 DIAGNOSIS — Z74.09 DECREASED STRENGTH, ENDURANCE, AND MOBILITY: ICD-10-CM

## 2025-06-17 DIAGNOSIS — R53.1 DECREASED STRENGTH, ENDURANCE, AND MOBILITY: ICD-10-CM

## 2025-06-17 DIAGNOSIS — R68.89 DECREASED STRENGTH, ENDURANCE, AND MOBILITY: ICD-10-CM

## 2025-06-17 PROCEDURE — 97530 THERAPEUTIC ACTIVITIES: CPT | Mod: PN

## 2025-06-17 PROCEDURE — 97140 MANUAL THERAPY 1/> REGIONS: CPT | Mod: PN
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
## 2025-06-19 ENCOUNTER — CLINICAL SUPPORT (OUTPATIENT)
Dept: REHABILITATION | Facility: OTHER | Age: 30
End: 2025-06-19
Payer: COMMERCIAL

## 2025-06-19 DIAGNOSIS — R29.3 POSTURAL IMBALANCE: Primary | ICD-10-CM

## 2025-06-19 DIAGNOSIS — R53.1 DECREASED STRENGTH, ENDURANCE, AND MOBILITY: ICD-10-CM

## 2025-06-19 DIAGNOSIS — Z74.09 DECREASED STRENGTH, ENDURANCE, AND MOBILITY: ICD-10-CM

## 2025-06-19 DIAGNOSIS — R68.89 DECREASED STRENGTH, ENDURANCE, AND MOBILITY: ICD-10-CM

## 2025-06-19 PROCEDURE — 97140 MANUAL THERAPY 1/> REGIONS: CPT | Mod: PN

## 2025-06-19 PROCEDURE — 97112 NEUROMUSCULAR REEDUCATION: CPT | Mod: PN

## 2025-06-19 PROCEDURE — 97530 THERAPEUTIC ACTIVITIES: CPT | Mod: PN
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
## 2025-06-23 ENCOUNTER — PATIENT MESSAGE (OUTPATIENT)
Dept: REHABILITATION | Facility: OTHER | Age: 30
End: 2025-06-23
Payer: COMMERCIAL

## 2025-06-26 ENCOUNTER — CLINICAL SUPPORT (OUTPATIENT)
Dept: REHABILITATION | Facility: OTHER | Age: 30
End: 2025-06-26
Payer: COMMERCIAL

## 2025-06-26 DIAGNOSIS — R53.1 DECREASED STRENGTH, ENDURANCE, AND MOBILITY: ICD-10-CM

## 2025-06-26 DIAGNOSIS — R68.89 DECREASED STRENGTH, ENDURANCE, AND MOBILITY: ICD-10-CM

## 2025-06-26 DIAGNOSIS — R29.3 POSTURAL IMBALANCE: Primary | ICD-10-CM

## 2025-06-26 DIAGNOSIS — Z74.09 DECREASED STRENGTH, ENDURANCE, AND MOBILITY: ICD-10-CM

## 2025-06-26 PROCEDURE — 97110 THERAPEUTIC EXERCISES: CPT | Mod: PN

## 2025-06-26 PROCEDURE — 97140 MANUAL THERAPY 1/> REGIONS: CPT | Mod: PN
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
## 2025-07-01 ENCOUNTER — CLINICAL SUPPORT (OUTPATIENT)
Dept: REHABILITATION | Facility: OTHER | Age: 30
End: 2025-07-01
Payer: COMMERCIAL

## 2025-07-01 DIAGNOSIS — R53.1 DECREASED STRENGTH, ENDURANCE, AND MOBILITY: ICD-10-CM

## 2025-07-01 DIAGNOSIS — Z74.09 DECREASED STRENGTH, ENDURANCE, AND MOBILITY: ICD-10-CM

## 2025-07-01 DIAGNOSIS — R29.3 POSTURAL IMBALANCE: Primary | ICD-10-CM

## 2025-07-01 DIAGNOSIS — R68.89 DECREASED STRENGTH, ENDURANCE, AND MOBILITY: ICD-10-CM

## 2025-07-01 PROCEDURE — 97140 MANUAL THERAPY 1/> REGIONS: CPT | Mod: PN

## 2025-07-01 PROCEDURE — 97112 NEUROMUSCULAR REEDUCATION: CPT | Mod: PN

## 2025-07-01 PROCEDURE — 97110 THERAPEUTIC EXERCISES: CPT | Mod: PN
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
## 2025-07-03 ENCOUNTER — CLINICAL SUPPORT (OUTPATIENT)
Dept: REHABILITATION | Facility: OTHER | Age: 30
End: 2025-07-03
Payer: COMMERCIAL

## 2025-07-03 DIAGNOSIS — R53.1 DECREASED STRENGTH, ENDURANCE, AND MOBILITY: ICD-10-CM

## 2025-07-03 DIAGNOSIS — R68.89 DECREASED STRENGTH, ENDURANCE, AND MOBILITY: ICD-10-CM

## 2025-07-03 DIAGNOSIS — Z74.09 DECREASED STRENGTH, ENDURANCE, AND MOBILITY: ICD-10-CM

## 2025-07-03 DIAGNOSIS — R29.3 POSTURAL IMBALANCE: Primary | ICD-10-CM

## 2025-07-03 PROCEDURE — 97530 THERAPEUTIC ACTIVITIES: CPT | Mod: PN

## 2025-07-03 PROCEDURE — 97110 THERAPEUTIC EXERCISES: CPT | Mod: PN
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
## 2025-07-08 ENCOUNTER — CLINICAL SUPPORT (OUTPATIENT)
Dept: REHABILITATION | Facility: OTHER | Age: 30
End: 2025-07-08
Payer: COMMERCIAL

## 2025-07-08 DIAGNOSIS — Z74.09 DECREASED STRENGTH, ENDURANCE, AND MOBILITY: ICD-10-CM

## 2025-07-08 DIAGNOSIS — R29.3 POSTURAL IMBALANCE: Primary | ICD-10-CM

## 2025-07-08 DIAGNOSIS — R53.1 DECREASED STRENGTH, ENDURANCE, AND MOBILITY: ICD-10-CM

## 2025-07-08 DIAGNOSIS — R68.89 DECREASED STRENGTH, ENDURANCE, AND MOBILITY: ICD-10-CM

## 2025-07-08 PROCEDURE — 97110 THERAPEUTIC EXERCISES: CPT | Mod: PN
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
## 2025-07-10 ENCOUNTER — CLINICAL SUPPORT (OUTPATIENT)
Dept: REHABILITATION | Facility: OTHER | Age: 30
End: 2025-07-10
Payer: COMMERCIAL

## 2025-07-10 DIAGNOSIS — Z74.09 DECREASED STRENGTH, ENDURANCE, AND MOBILITY: ICD-10-CM

## 2025-07-10 DIAGNOSIS — R68.89 DECREASED STRENGTH, ENDURANCE, AND MOBILITY: ICD-10-CM

## 2025-07-10 DIAGNOSIS — R53.1 DECREASED STRENGTH, ENDURANCE, AND MOBILITY: ICD-10-CM

## 2025-07-10 DIAGNOSIS — R29.3 POSTURAL IMBALANCE: Primary | ICD-10-CM

## 2025-07-10 PROCEDURE — 97110 THERAPEUTIC EXERCISES: CPT | Mod: PN
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
## 2025-07-16 ENCOUNTER — OFFICE VISIT (OUTPATIENT)
Dept: INTERNAL MEDICINE | Facility: CLINIC | Age: 30
End: 2025-07-16
Payer: COMMERCIAL

## 2025-07-16 VITALS
RESPIRATION RATE: 18 BRPM | HEART RATE: 106 BPM | DIASTOLIC BLOOD PRESSURE: 78 MMHG | TEMPERATURE: 96 F | HEIGHT: 68 IN | SYSTOLIC BLOOD PRESSURE: 112 MMHG | OXYGEN SATURATION: 96 % | WEIGHT: 201.75 LBS | BODY MASS INDEX: 30.58 KG/M2

## 2025-07-16 DIAGNOSIS — J45.20 ASTHMA, MILD INTERMITTENT, WELL-CONTROLLED: ICD-10-CM

## 2025-07-16 DIAGNOSIS — J01.40 ACUTE PANSINUSITIS, RECURRENCE NOT SPECIFIED: Primary | ICD-10-CM

## 2025-07-16 PROCEDURE — 3008F BODY MASS INDEX DOCD: CPT | Mod: CPTII,S$GLB,, | Performed by: NURSE PRACTITIONER

## 2025-07-16 PROCEDURE — 99213 OFFICE O/P EST LOW 20 MIN: CPT | Mod: 25,S$GLB,, | Performed by: NURSE PRACTITIONER

## 2025-07-16 PROCEDURE — 1160F RVW MEDS BY RX/DR IN RCRD: CPT | Mod: CPTII,S$GLB,, | Performed by: NURSE PRACTITIONER

## 2025-07-16 PROCEDURE — 3078F DIAST BP <80 MM HG: CPT | Mod: CPTII,S$GLB,, | Performed by: NURSE PRACTITIONER

## 2025-07-16 PROCEDURE — 99999 PR PBB SHADOW E&M-EST. PATIENT-LVL IV: CPT | Mod: PBBFAC,,, | Performed by: NURSE PRACTITIONER

## 2025-07-16 PROCEDURE — 3074F SYST BP LT 130 MM HG: CPT | Mod: CPTII,S$GLB,, | Performed by: NURSE PRACTITIONER

## 2025-07-16 PROCEDURE — 96372 THER/PROPH/DIAG INJ SC/IM: CPT | Mod: S$GLB,,, | Performed by: NURSE PRACTITIONER

## 2025-07-16 PROCEDURE — 1159F MED LIST DOCD IN RCRD: CPT | Mod: CPTII,S$GLB,, | Performed by: NURSE PRACTITIONER

## 2025-07-16 RX ORDER — AMOXICILLIN AND CLAVULANATE POTASSIUM 400; 57 MG/5ML; MG/5ML
800 POWDER, FOR SUSPENSION ORAL EVERY 12 HOURS
Qty: 140 ML | Refills: 0 | Status: SHIPPED | OUTPATIENT
Start: 2025-07-16 | End: 2025-07-23

## 2025-07-16 NOTE — PROGRESS NOTES
Subjective:       Patient ID: Maria Ines Lea is a 29 y.o. male.    Chief Complaint: Sinus Problem (Coughing up mucus ( red), no fever, headaches, congestion, SOB)    History of Present Illness    CHIEF COMPLAINT:  Mr. Lea presents today for sinus infection that started two days ago.    HISTORY OF PRESENT ILLNESS:  He reports current sinus infection symptoms that began approximately two days ago. Initially experienced an unusual sensation in the back of the throat after using nasal spray. Symptoms progressively worsened, developing nasal congestion, shortness of breath, and mucus formation. Mucus production initially presented with blood-tinged appearance, then transitioned to lighter green, and currently appears dark green. He denies fever, chills, or significant pain.    CURRENT MEDICATIONS:  He has been self-treating with Mucinex and Dayquil, noting reduced effectiveness of Mucinex due to potential tolerance. Dayquil has provided some improvement in mucus clearance. He is currently taking Flonase 24-hour nasal spray daily, along with either Claritin or Zyrtec as an antihistamine. He has been using neti pot without success.    MEDICAL HISTORY:  He has a history of recurrent annual sinus infections and asthma. Previous treatments for sinus infections have included astelin nasal spray, oral steroids with tapering dose, liquid medication (Amoxicillin) and steroid injection.    ALLERGIES:  He reports significant allergies to grass and cats. Recently moved to a property with extensive grass coverage. During recent lawn mowing, he was exposed to grass allergens without protective equipment. He experiences allergic reactions to wild cats present in his yard. He typically attempts to wear face mask, goggles, and long sleeves when performing outdoor yard work.      ROS:  ENT: +nasal congestion, +sinus pressure, +sore throat, +nasal discharge  Respiratory: +difficulty breathing, +shortness of breath, +productive cough  "        Review of patient's allergies indicates:   Allergen Reactions    Weed pollen-giant (tall) ragweed Hives    Cat dander Other (See Comments)    Dog dander Other (See Comments)    Grass pollen Other (See Comments)       Medication List with Changes/Refills   New Medications    AMOXICILLIN-CLAVULANATE (AUGMENTIN) 400-57 MG/5 ML SUSR    Take 10 mLs (800 mg total) by mouth every 12 (twelve) hours. for 7 days   Current Medications    ALBUTEROL (PROAIR HFA) 90 MCG/ACTUATION INHALER    Inhale 2 puffs into the lungs every 4 (four) hours as needed for Shortness of Breath. 1 - 2 HFA Aerosol Inhaler Inhalation Every 4-6 hours    AZELASTINE (ASTELIN) 137 MCG (0.1 %) NASAL SPRAY    2 sprays 2 (two) times daily.    CETIRIZINE (ZYRTEC) 10 MG TABLET    Take 1 tablet (10 mg total) by mouth once daily.    FLUTICASONE (FLONASE) 50 MCG/ACTUATION NASAL SPRAY    1 spray (50 mcg total) by Each Nare route 2 (two) times daily.    LORATADINE (CHILDREN'S CLARITIN) 5 MG CHEWABLE TABLET    as directed Orally     Objective:   /78 (BP Location: Right arm, Patient Position: Sitting)   Pulse 106   Temp 96.4 °F (35.8 °C) (Temporal)   Resp 18   Ht 5' 8" (1.727 m)   Wt 91.5 kg (201 lb 11.5 oz)   SpO2 96%   BMI 30.67 kg/m²     Physical Exam  Vitals reviewed.   Constitutional:       Appearance: Normal appearance.   HENT:      Head: Normocephalic and atraumatic.      Right Ear: Tympanic membrane is bulging.      Left Ear: Tympanic membrane is bulging.      Nose:      Right Turbinates: Swollen and pale.      Left Turbinates: Swollen and pale.      Right Sinus: No maxillary sinus tenderness or frontal sinus tenderness.      Left Sinus: No maxillary sinus tenderness or frontal sinus tenderness.      Mouth/Throat:      Pharynx: Pharyngeal swelling present. No posterior oropharyngeal erythema.   Cardiovascular:      Rate and Rhythm: Normal rate and regular rhythm.      Heart sounds: Normal heart sounds. No murmur heard.  Pulmonary:      " Effort: Pulmonary effort is normal.      Breath sounds: Normal breath sounds. No wheezing.   Lymphadenopathy:      Head:      Right side of head: No submental, submandibular or tonsillar adenopathy.      Left side of head: No submental, submandibular or tonsillar adenopathy.   Skin:     General: Skin is warm and dry.   Neurological:      Mental Status: He is alert and oriented to person, place, and time.       Assessment and Plan:     Assessment & Plan    Diagnosed acute sinusitis based on symptoms and physical exam findings.  Opted for conservative management with steroids and symptomatic treatment initially, deferring antibiotics due to recent onset of symptoms.  Continued current allergy medications (Claritin, Flonase) to manage underlying allergies contributing to recurrent sinusitis.  Considered long-term management strategies for recurrent sinusitis, including regular use of intranasal steroids and antihistamines during peak allergy seasons.    1. Acute pansinusitis, recurrence not specified (Primary)    Administered 40 mg solumedrol injection in office.  Prescribed amoxicillin-clavulanate 800 mg/114 mg oral suspension, 10 mL twice daily, to begin on Saturday if symptoms not improved.    Educated on seasonal pattern of recurrent sinusitis, likely correlating with peak allergy seasons, particularly spring/summer pollen exposure.  - Recommend preventive measures from January to August, including daily Flonase nasal spray (discontinuing Astelin) and continuing daily Claritin.    - methylPREDNISolone sodium succinate injection 40 mg  - amoxicillin-clavulanate (AUGMENTIN) 400-57 mg/5 mL SusR; Take 10 mLs (800 mg total) by mouth every 12 (twelve) hours. for 7 days  Dispense: 140 mL; Refill: 0    2. Asthma, mild intermittent, well-controlled    Chronic, stable.           This note was generated with the assistance of ambient listening technology. Verbal consent was obtained by the patient and accompanying visitor(s)  for the recording of patient appointment to facilitate this note. I attest to having reviewed and edited the generated note for accuracy, though some syntax or spelling errors may persist. Please contact the author of this note for any clarification.

## 2025-07-16 NOTE — LETTER
July 16, 2025      [facility]  [address]  [place] [address]  Phone: [phone]  Fax: [phone]       Patient: Maria Ines Lea   YOB: 1995  Date of Visit: 07/16/2025    To Whom It May Concern:    Kim Lea  was at [facility] on 07/16/2025. The patient may return to work/school on 07/21/25 with no restrictions. May return sooner if feeling well. If you have any questions or concerns, or if I can be of further assistance, please do not hesitate to contact me.    Sincerely,    Kinga Lucero NP